# Patient Record
Sex: MALE | Race: WHITE | Employment: OTHER | URBAN - METROPOLITAN AREA
[De-identification: names, ages, dates, MRNs, and addresses within clinical notes are randomized per-mention and may not be internally consistent; named-entity substitution may affect disease eponyms.]

---

## 2017-02-15 ENCOUNTER — HOSPITAL ENCOUNTER (EMERGENCY)
Facility: CLINIC | Age: 77
Discharge: HOME | End: 2017-02-15
Attending: EMERGENCY MEDICINE | Admitting: EMERGENCY MEDICINE
Payer: MEDICARE

## 2017-02-15 ENCOUNTER — APPOINTMENT (OUTPATIENT)
Dept: GENERAL RADIOLOGY | Facility: CLINIC | Age: 77
End: 2017-02-15
Attending: EMERGENCY MEDICINE
Payer: MEDICARE

## 2017-02-15 VITALS
RESPIRATION RATE: 18 BRPM | HEIGHT: 69 IN | BODY MASS INDEX: 26.66 KG/M2 | SYSTOLIC BLOOD PRESSURE: 176 MMHG | OXYGEN SATURATION: 97 % | HEART RATE: 55 BPM | TEMPERATURE: 97.8 F | DIASTOLIC BLOOD PRESSURE: 74 MMHG | WEIGHT: 180 LBS

## 2017-02-15 DIAGNOSIS — R07.9 CHEST PAIN, UNSPECIFIED TYPE: ICD-10-CM

## 2017-02-15 LAB
ANION GAP SERPL CALCULATED.3IONS-SCNC: 9 MMOL/L (ref 3–14)
BUN SERPL-MCNC: 18 MG/DL (ref 7–30)
CALCIUM SERPL-MCNC: 8.7 MG/DL (ref 8.5–10.1)
CHLORIDE SERPL-SCNC: 106 MMOL/L (ref 94–109)
CO2 SERPL-SCNC: 27 MMOL/L (ref 20–32)
CREAT SERPL-MCNC: 1.1 MG/DL (ref 0.66–1.25)
ERYTHROCYTE [DISTWIDTH] IN BLOOD BY AUTOMATED COUNT: 13.1 % (ref 10–15)
GFR SERPL CREATININE-BSD FRML MDRD: 65 ML/MIN/1.7M2
GLUCOSE SERPL-MCNC: 155 MG/DL (ref 70–99)
HCT VFR BLD AUTO: 39.9 % (ref 40–53)
HGB BLD-MCNC: 14 G/DL (ref 13.3–17.7)
MCH RBC QN AUTO: 31 PG (ref 26.5–33)
MCHC RBC AUTO-ENTMCNC: 35.1 G/DL (ref 31.5–36.5)
MCV RBC AUTO: 88 FL (ref 78–100)
PLATELET # BLD AUTO: 152 10E9/L (ref 150–450)
POTASSIUM SERPL-SCNC: 3.6 MMOL/L (ref 3.4–5.3)
RBC # BLD AUTO: 4.52 10E12/L (ref 4.4–5.9)
SODIUM SERPL-SCNC: 142 MMOL/L (ref 133–144)
TROPONIN I SERPL-MCNC: NORMAL UG/L (ref 0–0.04)
TROPONIN I SERPL-MCNC: NORMAL UG/L (ref 0–0.04)
WBC # BLD AUTO: 7.1 10E9/L (ref 4–11)

## 2017-02-15 PROCEDURE — 99285 EMERGENCY DEPT VISIT HI MDM: CPT | Mod: 25

## 2017-02-15 PROCEDURE — A9270 NON-COVERED ITEM OR SERVICE: HCPCS | Mod: GY | Performed by: EMERGENCY MEDICINE

## 2017-02-15 PROCEDURE — 85027 COMPLETE CBC AUTOMATED: CPT | Performed by: EMERGENCY MEDICINE

## 2017-02-15 PROCEDURE — 84484 ASSAY OF TROPONIN QUANT: CPT | Mod: 91 | Performed by: EMERGENCY MEDICINE

## 2017-02-15 PROCEDURE — 93005 ELECTROCARDIOGRAM TRACING: CPT

## 2017-02-15 PROCEDURE — 25000132 ZZH RX MED GY IP 250 OP 250 PS 637: Mod: GY | Performed by: EMERGENCY MEDICINE

## 2017-02-15 PROCEDURE — 80048 BASIC METABOLIC PNL TOTAL CA: CPT | Performed by: EMERGENCY MEDICINE

## 2017-02-15 PROCEDURE — 71020 XR CHEST 2 VW: CPT

## 2017-02-15 RX ORDER — NITROGLYCERIN 0.4 MG/1
0.4 TABLET SUBLINGUAL EVERY 5 MIN PRN
Status: DISCONTINUED | OUTPATIENT
Start: 2017-02-15 | End: 2017-02-15 | Stop reason: HOSPADM

## 2017-02-15 RX ORDER — ASPIRIN 81 MG/1
324 TABLET, CHEWABLE ORAL ONCE
Status: COMPLETED | OUTPATIENT
Start: 2017-02-15 | End: 2017-02-15

## 2017-02-15 RX ADMIN — ASPIRIN 81 MG 324 MG: 81 TABLET ORAL at 14:25

## 2017-02-15 ASSESSMENT — ENCOUNTER SYMPTOMS
SHORTNESS OF BREATH: 0
VOMITING: 0
CHEST TIGHTNESS: 1
NAUSEA: 1

## 2017-02-15 NOTE — ED PROVIDER NOTES
History     Chief Complaint:  Chest Pain      The history is provided by the patient.      Robby Mccoy is a 76 year old male s/p CABG x 2 who presents with chest pain.  Over the last 2 days when bending over the patient experienced bilateral chest pain.  This pain was present only when bent over and then resolve immediately when upright. Today, at approximately 1000, he noticed he felt unwell with tightness in his chest constantly prompting visit to the emergency department.  Currently he rates his discomfort at 4/10 in severity located on the left side of his chest that has come and gone since onset.  He has not identified any aggravating or alleviating factors.  This discomfort is not similar to the pain he experienced prior to past bypasses.  He denies any shortness of breath, vomiting, hemoptysis, or other complaint.      PE/DVT RISK FACTORS:  Sex:    M  Tobacco:   Former smoker, QD 1988  Cancer:   Neg  Travel:   Neg  Surgery:   Neg  Other immobilization: Neg  Personal history:  Pos  Family history:  Neg     Allergies:  Clindamycin Base      Medications:    Omeprazole  Zantac  Finasteride  Levocetirizine dihydrochloride   Crestor  Aspirin 81 mg  Metoprolol  Lisinopril      Past Medical History:    Low back pain  Spinal stenosis  CAD  DVT    Past Surgical History:    CABG x 2    Family History:    History reviewed. No pertinent family history.      Social History:  Presents with spouse  Tobacco use: Former smoker, QD 1988   Alcohol use: Positive  PCP: Efren Herndon    Marital Status:          Review of Systems   Respiratory: Positive for chest tightness. Negative for shortness of breath.    Cardiovascular: Positive for chest pain.   Gastrointestinal: Positive for nausea. Negative for vomiting.   All other systems reviewed and are negative.      Physical Exam     Patient Vitals for the past 24 hrs:   BP Temp Temp src Pulse Heart Rate Resp SpO2 Height Weight   02/15/17 1645 165/69 - - - 46 - 98  "% - -   02/15/17 1630 163/72 - - - 46 - 98 % - -   02/15/17 1615 159/70 - - - 48 - 97 % - -   02/15/17 1600 158/68 - - - 46 - 97 % - -   02/15/17 1545 163/70 - - - 47 - 97 % - -   02/15/17 1530 158/67 - - - 48 - 99 % - -   02/15/17 1515 156/69 - - - 48 - 99 % - -   02/15/17 1445 165/70 - - - 50 - 99 % - -   02/15/17 1430 160/69 - - - 51 - 97 % - -   02/15/17 1415 - - - - 53 - 98 % - -   02/15/17 1408 (!) 203/85 - - - - - - - -   02/15/17 1407 - 97.8  F (36.6  C) Oral 55 - 18 97 % 1.753 m (5' 9\") 81.6 kg (180 lb)      Physical Exam    General:   Pleasant, age appropriate.     Patient resting comfortably in the bed with no objective signs of pain.  HEENT:    Oropharynx is moist, without lesions or trismus.  Eyes:    Conjunctiva normal, PERRL  Neck:    Supple, no meningismus.     CV:     Regular rate and rhythm.      No murmurs, rubs or gallops.       No JVD or unilateral leg swelling.       2+ radial pulses bilateral.       No lower extremity edema.  PULM:    Clear to auscultation bilateral.       No respiratory distress.      Good air exchange.     No rales or wheezing.  ABD:    Soft, non-tender, non-distended.       No pulsatile masses.       No rebound, guarding or rigidity.  MSK:     No gross deformity to all four extremities.   LYMPH:   No cervical lymphadenopathy.  NEURO:   Alert and oriented x 3.      Strength is equal and symmetric.  Skin:    Warm, dry and intact.    Psych:    Mood is good and affect is appropriate.        Emergency Department Course   ECG (14:07:41):  Rate 54 bpm. KS interval 172. QRS duration 88. QT/QTc 422/400. P-R-T axes 47 -27 32.  Sinus bradycardia.  Septal infarct, age undetermined.  Abnormal ECG.  Interpreted at 1410 by Peter Villa MD.     Imaging:  Radiographic findings were communicated with the patient and family who voiced understanding of the findings.    XR Chest:  IMPRESSION:  Sternal wires. 1.5 cm linear rodlike opacity/foreign body projects over the posterior right " lateral heart, anatomic location and significance not known. Correlate clinically. Otherwise negative chest. Lungs are clear. No acute process.    MARINA HU MD    Preliminary result per radiology.    Laboratory:  CBC: WNL (WBC 7.1, HGB 14.0, )   BMP: Glucose 155 (H) ow WNL (Creatinine 1.10)   1413: Troponin: <0.015   1623: Troponin: <0.015     Interventions:  1425: Aspirin chewable 324 mg PO    Emergency Department Course:  Past medical records, nursing notes, and vitals reviewed.  1409: I performed an exam of the patient as documented above.    IV inserted and blood drawn. The patient was placed on continuous cardiac monitoring and pulse oximetry.   Nitrostat not given as patient reported to nursing staff chest pain had completely resolved.    The patient was sent for a XR while in the emergency department, findings above.   I personally reviewed the laboratory results with the Patient and spouse and answered all related questions prior to discharge.    1705: I rechecked the patient.  Findings and plan explained to the Patient and spouse. Patient discharged home with instructions regarding supportive care, medications, and reasons to return. The importance of close follow-up was reviewed.      Impression & Plan    Medical Decision Making:  Robby Mccoy is a 76 year old male with a history of coronary artery disease who presents to the emergency department with atypical chest pain.  His initial chest pain only occurred when bending over and then today has been present since 10 am.  It is not classic for ACS and it is atypical to the symptoms he had leading up to his bypass surgery.  His EKG shows no ischemic changes.  He became symptom free upon presentation and did not receive any nitroglycerin.  Full dose aspirin provided.  Initial blood work was unrevealing.  His 2 hour delta troponin, done a little over 6 hours after onset of symptoms, is negative thus ruling out myocardial infarction.  No  clinical concern for PE, aortic dissection, or aneurysm.  Since he has been ruled out he is safe for discharge home.  I will arrange emergent outpatient stress test in next 72 hours and close follow up with primary physician or cardiologist.      Diagnosis:    ICD-10-CM    1. Chest pain, unspecified type R07.9 Exercise Stress Echocardiogram       Disposition:  Discharged to home with plan as outlined.      Joao Bridges  2/15/2017   [facility] EMERGENCY DEPARTMENT    I, Joao Bridges, am serving as a scribe at 2:09 PM on 2/15/2017 to document services personally performed by Peter Villa MD based on my observations and the provider's statements to me.       Peter Villa MD  02/15/17 0187

## 2017-02-15 NOTE — ED NOTES
Pt back from imaging and placed on monitors, continues to report no pain. Respirations equal and unlabored. Pt reports it is normal for his HR to be in the 40's.

## 2017-02-15 NOTE — ED NOTES
Pt reporting no chest tightness at this time. Dr. Villa informed, no nitro administered at this time, per MD request

## 2017-02-15 NOTE — ED AVS SNAPSHOT
[facility] Emergency Department    [address]    [facility] [address]    Phone:  [phone]    Fax:  [phone]                                       Robby Mccoy   MRN: [id number]    Department:  [facility] Emergency Department   Date of Visit:  2/15/2017           Patient Information     Date Of Birth          1940        Your diagnoses for this visit were:     Chest pain, unspecified type        You were seen by Peter Villa MD.      Follow-up Information     Follow up with Efren Lynn MD. Schedule an appointment as soon as possible for a visit in 2 days.    Specialty:  Family Medicine - Sports Medicine    Contact information:    [facility]  [address]  [facility] [id number] [phone]          Discharge Instructions       Discharge Instructions  Chest Pain    You have been seen today for chest pain or discomfort.  At this time, your doctor has found no signs that your chest pain is due to a serious or life-threatening condition, (or you have declined more testing and/or admission to the hospital). However, sometimes there is a serious problem that does not show up right away. Your evaluation today may not be complete and you may need further testing and evaluation.     You need to follow-up with your regular doctor within 3 days.    Return to the Emergency Department if:    Your chest pain changes, gets worse, starts to happen more often, or comes with less activity.    You are short of breath.    You get very weak or tired.    You pass out or faint.    You have any new symptoms, like fever, cough, numb legs, or you cough up blood.    You have anything else that worries you.    Until you follow-up with your regular doctor please do the following:    Take one aspirin daily unless you have an allergy or are told not to by your doctor.    If a stress test appointment has been made, go to the appointment.    If you  have questions, contact your regular doctor.    If your doctor today has told you to follow-up with your regular doctor, it is very important that you make an appointment with your clinic and go to the appointment.  If you do not follow-up with your primary doctor, it may result in missing an important development which could result in permanent injury or disability and/or lasting pain.  If there is any problem keeping your appointment, call your doctor or return to the Emergency Department.    If you were given a prescription for medicine here today, be sure to read all of the information (including the package insert) that comes with your prescription.  This will include important information about the medicine, its side effects, and any warnings that you need to know about.  The pharmacist who fills the prescription can provide more information and answer questions you may have about the medicine.  If you have questions or concerns that the pharmacist cannot address, please call or return to the Emergency Department.         24 Hour Appointment Hotline       To make an appointment at any [facility], call [phone] ([phone]). If you don't have a family doctor or clinic, we will help you find one. [facility] are conveniently located to serve the needs of you and your family.          ED Discharge Orders     Exercise Stress Echocardiogram       The supervising Cardiologist may change the type of echocardiogram requested to answer a specific clinical question based on existing protocols in the Echocardiography laboratory.    Use of contrast is at the discretion of the supervising Cardiologist.                     Review of your medicines      Our records show that you are taking the medicines listed below. If these are incorrect, please call your family doctor or clinic.        Dose / Directions Last dose taken    CRESTOR 20 MG tablet   Dose:  20 mg   Generic drug:  rosuvastatin        Take 20  mg by mouth daily.   Refills:  0        FINASTERIDE PO   Dose:  5 mg        Take 5 mg by mouth daily   Refills:  0        FLAXSEED OIL PO        Take by mouth daily   Refills:  0        LEVOCETIRIZINE DIHYDROCHLORIDE PO   Dose:  5 mg        Take 5 mg by mouth every evening   Refills:  0        lisinopril 5 MG tablet   Commonly known as:  PRINIVIL/ZESTRIL   Dose:  5 mg        Take 5 mg by mouth daily.   Refills:  0        metoprolol 25 MG 24 hr tablet   Commonly known as:  TOPROL-XL   Dose:  25 mg        Take 25 mg by mouth daily.   Refills:  0        OMEPRAZOLE PO   Dose:  20 mg        Take 20 mg by mouth every morning   Refills:  0        PLAVIX 75 MG tablet   Dose:  75 mg   Generic drug:  clopidogrel        Take 75 mg by mouth daily.   Refills:  0        ZANTAC PO   Dose:  150 mg        Take 150 mg by mouth At Bedtime   Refills:  0                Procedures and tests performed during your visit     Procedure/Test Number of Times Performed    Basic metabolic panel (BMP) 1    CBC (platelets, no diff) 1    Chest XR,  PA & LAT 1    EKG 12 lead 1    Peripheral IV catheter 1    Troponin I (now) 2      Orders Needing Specimen Collection     None      Pending Results     Date and Time Order Name Status Description    2/15/2017 1404 EKG 12 lead Preliminary             Pending Culture Results     No orders found from 2/13/2017 to 2/16/2017.             Test Results from your hospital stay     2/15/2017  2:40 PM - Interface, Flexilab Results      Component Results     Component Value Ref Range & Units Status    WBC 7.1 4.0 - 11.0 10e9/L Final    RBC Count 4.52 4.4 - 5.9 10e12/L Final    Hemoglobin 14.0 13.3 - 17.7 g/dL Final    Hematocrit 39.9 (L) 40.0 - 53.0 % Final    MCV 88 78 - 100 fl Final    MCH 31.0 26.5 - 33.0 pg Final    MCHC 35.1 31.5 - 36.5 g/dL Final    RDW 13.1 10.0 - 15.0 % Final    Platelet Count 152 150 - 450 10e9/L Final         2/15/2017  3:00 PM - Interface, Flexilab Results      Component Results      Component Value Ref Range & Units Status    Sodium 142 133 - 144 mmol/L Final    Potassium 3.6 3.4 - 5.3 mmol/L Final    Chloride 106 94 - 109 mmol/L Final    Carbon Dioxide 27 20 - 32 mmol/L Final    Anion Gap 9 3 - 14 mmol/L Final    Glucose 155 (H) 70 - 99 mg/dL Final    Urea Nitrogen 18 7 - 30 mg/dL Final    Creatinine 1.10 0.66 - 1.25 mg/dL Final    GFR Estimate 65 >60 mL/min/1.7m2 Final    Non  GFR Calc    GFR Estimate If Black 78 >60 mL/min/1.7m2 Final    African American GFR Calc    Calcium 8.7 8.5 - 10.1 mg/dL Final         2/15/2017  3:00 PM - Interface, Flexilab Results      Component Results     Component Value Ref Range & Units Status    Troponin I ES  0.000 - 0.045 ug/L Final    <0.015  The 99th percentile for upper reference range is 0.045 ug/L.  Troponin values in   the range of 0.045 - 0.120 ug/L may be associated with risks of adverse   clinical events.           2/15/2017  3:24 PM - Interface, Radiant Ib      Narrative     XR CHEST 2 VW  2/15/2017 2:59 PM    HISTORY:  chest pain    COMPARISON:  None.        Impression     IMPRESSION:  Sternal wires. 1.5 cm linear rodlike opacity/foreign body  projects over the posterior right lateral heart, anatomic location and  significance not known. Correlate clinically. Otherwise negative  chest. Lungs are clear. No acute process.    MARINA HU MD         2/15/2017  4:57 PM - Interface, Flexilab Results      Component Results     Component Value Ref Range & Units Status    Troponin I ES  0.000 - 0.045 ug/L Final    <0.015  The 99th percentile for upper reference range is 0.045 ug/L.  Troponin values in   the range of 0.045 - 0.120 ug/L may be associated with risks of adverse   clinical events.                  Clinical Quality Measure: Blood Pressure Screening     Your blood pressure was checked while you were in the emergency department today. The last reading we obtained was  BP: 165/69 . Please read the guidelines below about what these  numbers mean and what you should do about them.  If your systolic blood pressure (the top number) is less than 120 and your diastolic blood pressure (the bottom number) is less than 80, then your blood pressure is normal. There is nothing more that you need to do about it.  If your systolic blood pressure (the top number) is 120-139 or your diastolic blood pressure (the bottom number) is 80-89, your blood pressure may be higher than it should be. You should have your blood pressure rechecked within a year by a primary care provider.  If your systolic blood pressure (the top number) is 140 or greater or your diastolic blood pressure (the bottom number) is 90 or greater, you may have high blood pressure. High blood pressure is treatable, but if left untreated over time it can put you at risk for heart attack, stroke, or kidney failure. You should have your blood pressure rechecked by a primary care provider within the next 4 weeks.  If your provider in the emergency department today gave you specific instructions to follow-up with your doctor or provider even sooner than that, you should follow that instruction and not wait for up to 4 weeks for your follow-up visit.        Thank you for choosing [facility]       Thank you for choosing [facility] for your care. Our goal is always to provide you with excellent care. Hearing back from our patients is one way we can continue to improve our services. Please take a few minutes to complete the written survey that you may receive in the mail after you visit with us. Thank you!        [employer] Information     [employer] gives you secure access to your electronic health record. If you see a primary care provider, you can also send messages to your care team and make appointments. If you have questions, please call your primary care clinic.  If you do not have a primary care provider, please call [phone] and they will assist you.        Care EveryWhere ID     This is your Care  EveryWhere ID. This could be used by other organizations to access your [place] medical records  [id number]        After Visit Summary       This is your record. Keep this with you and show to your community pharmacist(s) and doctor(s) at your next visit.

## 2017-02-15 NOTE — ED NOTES
"Pt presents to ED reporting for the past two days when he bends over noticing left sided chest tightness reports today the pain being there constant. Hx of a bipass, took one baby aspirin today, states \"I just don't feel right\" ABCs intact. A/OX3  "

## 2017-02-15 NOTE — ED AVS SNAPSHOT
[facility] Emergency Department    [address]    [facility] [address]    Phone:  [phone]    Fax:  [phone]                                       Robby Mccoy   MRN: [id number]    Department:  [facility] Emergency Department   Date of Visit:  2/15/2017           After Visit Summary Signature Page     I have received my discharge instructions, and my questions have been answered. I have discussed any challenges I see with this plan with the nurse or doctor.    ..........................................................................................................................................  Patient/Patient Representative Signature      ..........................................................................................................................................  Patient Representative Print Name and Relationship to Patient    ..................................................               ................................................  Date                                            Time    ..........................................................................................................................................  Reviewed by Signature/Title    ...................................................              ..............................................  Date                                                            Time

## 2017-02-16 ENCOUNTER — HOSPITAL ENCOUNTER (OUTPATIENT)
Dept: CARDIOLOGY | Facility: CLINIC | Age: 77
Discharge: HOME | End: 2017-02-16
Attending: EMERGENCY MEDICINE | Admitting: EMERGENCY MEDICINE
Payer: MEDICARE

## 2017-02-16 DIAGNOSIS — R07.9 CHEST PAIN, UNSPECIFIED TYPE: ICD-10-CM

## 2017-02-16 LAB — INTERPRETATION ECG - MUSE: NORMAL

## 2017-02-16 PROCEDURE — 93325 DOPPLER ECHO COLOR FLOW MAPG: CPT | Mod: 26 | Performed by: INTERNAL MEDICINE

## 2017-02-16 PROCEDURE — 93016 CV STRESS TEST SUPVJ ONLY: CPT | Performed by: INTERNAL MEDICINE

## 2017-02-16 PROCEDURE — 93350 STRESS TTE ONLY: CPT | Mod: 26 | Performed by: INTERNAL MEDICINE

## 2017-02-16 PROCEDURE — 93350 STRESS TTE ONLY: CPT | Mod: TC

## 2017-02-16 PROCEDURE — 93321 DOPPLER ECHO F-UP/LMTD STD: CPT | Mod: 26 | Performed by: INTERNAL MEDICINE

## 2017-02-16 PROCEDURE — 93018 CV STRESS TEST I&R ONLY: CPT | Performed by: INTERNAL MEDICINE

## 2018-01-25 ENCOUNTER — TRANSFERRED RECORDS (OUTPATIENT)
Dept: HEALTH INFORMATION MANAGEMENT | Facility: CLINIC | Age: 78
End: 2018-01-25

## 2018-02-05 ENCOUNTER — TRANSFERRED RECORDS (OUTPATIENT)
Dept: HEALTH INFORMATION MANAGEMENT | Facility: CLINIC | Age: 78
End: 2018-02-05

## 2018-03-20 NOTE — TELEPHONE ENCOUNTER
APPT INFO    Date /Time: 3/29/18 12pm   Reason for Appt: Lower Back Pain   Ref Provider/Clinic: self   Are there internal records? Yes/No?  IF YES, list clinic names: Yes - [facility] (Dr. Davila)    Operative Note 10/30/08 in Epic    PT notes in Epic, Imaging in pacs   Are there outside records? Yes/No? Yes - see below   Patient Contact (Y/N) & Call Details: No - records in [facility]   Action: CareEverywhere records reviewed. See CareEverywhere Tab.    Faxed request to [facility] for records     OUTSIDE RECORDS CHECKLIST     CLINIC NAME COMMENTS REC (x) IMG (x)   [facility]    (Care Everywhere) OFFICE NOTES: 12/14/17, 8/29/17, 7/27/17    RADIOLOGY:   MR T Spine 12/15/17  XR L Spine 12/14/17  XR T Spine 12/14/17  XR C Spine 6/3/16        x x   [facility]  x x

## 2018-03-21 NOTE — TELEPHONE ENCOUNTER
Records Received From: [facility]    Date/Exam/Location  (specify location if different)   Office Notes: 2/5/18, 1/8/18, 8/22/16, 7/14/16, 11/8/07, 10/8/07, 6/22/06   Radiology Reports: XR L Spine 1/25/18 - [facility]  MR L Spine 1/25/18 - [facility]    ** Imaging pushed to pacs - Notified [facility] to pull.     PT/OT Notes: 2/5/18, 7/14/16

## 2018-03-21 NOTE — TELEPHONE ENCOUNTER
Received Imaging From: [facility]    Image Type (x): Disc:___  Pacs:_x__      Exam Date/Name: See previous note Comments: Notified Mad[facility] to pull

## 2018-03-29 ENCOUNTER — OFFICE VISIT (OUTPATIENT)
Dept: ORTHOPEDICS | Facility: CLINIC | Age: 78
End: 2018-03-29
Payer: COMMERCIAL

## 2018-03-29 ENCOUNTER — PRE VISIT (OUTPATIENT)
Dept: ORTHOPEDICS | Facility: CLINIC | Age: 78
End: 2018-03-29

## 2018-03-29 VITALS — BODY MASS INDEX: 27.13 KG/M2 | HEIGHT: 69 IN | WEIGHT: 183.2 LBS

## 2018-03-29 DIAGNOSIS — M54.50 CHRONIC RIGHT-SIDED LOW BACK PAIN WITHOUT SCIATICA: Primary | ICD-10-CM

## 2018-03-29 DIAGNOSIS — G89.29 CHRONIC RIGHT-SIDED LOW BACK PAIN WITHOUT SCIATICA: Primary | ICD-10-CM

## 2018-03-29 RX ORDER — ASPIRIN 81 MG/1
81 TABLET ORAL DAILY
Status: ON HOLD | COMMUNITY
End: 2018-07-20

## 2018-03-29 RX ORDER — FLUTICASONE PROPIONATE 50 MCG
1 SPRAY, SUSPENSION (ML) NASAL DAILY PRN
COMMUNITY
Start: 2017-06-14 | End: 2021-09-15

## 2018-03-29 ASSESSMENT — ENCOUNTER SYMPTOMS
NECK PAIN: 1
BACK PAIN: 1
MUSCLE CRAMPS: 0
JOINT SWELLING: 0
MUSCLE WEAKNESS: 0
STIFFNESS: 1
MYALGIAS: 1
ARTHRALGIAS: 1

## 2018-03-29 NOTE — LETTER
3/29/2018       RE: Robby Mccoy  [id number]   [facility] [address]     Dear Colleague,    Thank you for referring your patient, Robby Mccoy, to the [facility] at [facility]. Please see a copy of my visit note below.    REASON FOR CONSULTATION: Consult (Right Mid back pain; Sharp and stabbing pain since July 2017.)     REFERRING PHYSICIAN: Referred Self   PCP:Efren Lynn A    History of Present Illness: 77-year-old male with a history of L4-L5 TLIF who presents with right back pain for approximately 9 months.  The patient first noticed his back pain while riding in a golf cart in July 2017.  His pain is intermittent, immediate onset and offset, emanates from the lateral aspect of his right lower back and radially radiates medially as electric sensations.  Patient denies numbness, weakness, radiation of pain to legs or inciting trauma.  Pain is worse with straining, sneezing, forward flexion, turning his trunk to the right.  Unsure if there are any alleviating factors.  He takes Tylenol twice daily, which offer some relief.  Patient has been doing home physical therapy since his surgery in 2008, however he also had a formal physical therapy visit for new exercises a proximally 1.5 months ago.  The pain occasionally wakes him up at night with certain body positions.  Appearance is pain 5 days per week, however he is not currently experiencing these episodes of pain at today's visit.  He denies urinary incontinence.  He states he was evaluated approximately 4 years ago for hematuria and was diagnosed with a possible renal stone.      Right Back 100%    Previous surgery:   - 10/27/2008 - right L4-5 TLIF with Dr. 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  Happy with results.  Has some residual left thigh numbness but preoperative pain is  "significantly improved      Oswestry (CHARLES) Questionnaire    OSWESTRY DISABILITY INDEX 3/29/2018   Count 9   Sum 16   Oswestry Score (%) 35.56   Some recent data might be hidden            Neck Disability Index (NDI) Questionnaire    Neck Disability Index (NDI) 3/29/2018   Neck Disability Index: Count 4   NDI: Total Score = SUM (points for all 10 findings) 2   Neck Disability in Percent = (Total Score) / 50 * 100 10 (%)   Some recent data might be hidden            Visual Analog Pain Scale  Back Pain Scale 0-10: 1  Right leg pain: 0  Left leg pain: 0    PROMIS-10 Scores  Global Mental Health Score: (P) 13  Global Physical Health Score: (P) 11  PROMIS TOTAL - SUBSCORES: (P) 24    ROS:  A 12-point review of systems was completed and is negative except for otherwise noted above in the history of present illness.    Med Hx:  No past medical history on file.    Surg Hx:  No past surgical history on file.    Allergies:  Allergies   Allergen Reactions     Clindamycin Base        Meds:  Current Outpatient Prescriptions   Medication     aspirin EC 81 MG EC tablet     fluticasone (FLONASE) 50 MCG/ACT spray     OMEPRAZOLE PO     Ranitidine HCl (ZANTAC PO)     LEVOCETIRIZINE DIHYDROCHLORIDE PO     Flaxseed, Linseed, (FLAXSEED OIL PO)     rosuvastatin (CRESTOR) 20 MG tablet     metoprolol (TOPROL-XL) 25 MG 24 hr tablet     lisinopril (PRINIVIL,ZESTRIL) 5 MG tablet     FINASTERIDE PO     clopidogrel (PLAVIX) 75 MG tablet     No current facility-administered medications for this visit.        Fam Hx:  No family history on file.    P/S Hx:  Social History   Substance Use Topics     Smoking status: Former Smoker     Quit date: 1/1/1988     Smokeless tobacco: Never Used     Alcohol use Yes         Physical Exam:  Very pleasant, healthy appearing, alert, oriented x 3, cooperative.  Normal mood and affect.  Not in cardiorespiratory distress.  Ht 1.753 m (5' 9\")  Wt 83.1 kg (183 lb 3.2 oz)  BMI 27.05 kg/m2  Normal upright posture.  "   Normal gait without assistive device.  No antalgia / imbalance.  Able to walk on toes and on heels with ease.  Back: no deformity, no skin lesions or surgical scars.  Localizes pain in soft tissues just superiorly to PSIS  Tenderness: (-) midline, (-) paraspinal, (-) R and L PSIS.  ROM:   Stiff with back flexion and extension but no pain.    Neuro Exam:  Motor: 5/5 strength for all muscle groups in both LE's.  Sensory:  Intact to light touch in both LE's.   Reflexes:  Knee 2+ bilat.  Ankle 2+ bilat.  (-) Babinski, (-) clonus.    Lower Extremity:  Equal leg lengths, full pulses, (-) atrophy / asymmetry.  Full painless passive knee and ankle motion.  Straight leg raise: (-) right, (-) left.  Hip impingement: (-) right, (-) left.  MOY/Shen's: (-) right, (-) left.      Sacroiliac Joint Tests:      TEST RIGHT LEFT   PSIS tenderness - -   Rachel finger sign - -   MOY/Shen - -   Thigh thrust - -           Gapping -   Compression -   Sacral thrust -   Gaenslen's -           Imaging:  -X-rays of lumbar spine obtained 1/25/2018: No dynamic instability with flexion and extension.  Posterior fusion hardware intact.  No screw pullout.  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    Plan:   -We will attempt to characterize the location of the patient's pain with differential injections, starting with a right L3-4 transforaminal CHLOE.  The patient will call after receiving the injection to report whether or not he received relief.  -Follow-up as needed at this time    All questions and concerns were answered to the patient's apparent satisfaction before leaving the clinic.     The patient was seen and discussed with Dr. Hadley    Respectfully,  Boy Pruitt MD  PGY-1, Orthopaedic Surgery      Attestation:  I have  personally evaluated and carried out discussion with the patient, and agree with the findings and plan outlined in the note.  77/m, s/p TLIF L4-5 (Giovanni 2008), successful at the time, now presents with R-sided back pain x 9 mos.  X-rays suggest already solid fusion L4-5; retained posterior fixation.  No listhesis/instability.  MRI shows adj segment spinal stenosis L3-4 (note: sxs not suggestive of neurogenic claudication).  P> R L3-4 TFESI.  If with good temporary relief, may ultimate consider MIS decompression/ fusion L3-4.  If (-), may send to Pain Clinic / musculoskeletal clinic for further nonop treatment.  TT > 30 mins, > 50% CC.        Again, thank you for allowing me to participate in the care of your patient.      Sincerely,    Ganga Hadley MD

## 2018-03-29 NOTE — NURSING NOTE
"Reason For Visit:   Chief Complaint   Patient presents with     Consult     Right Mid back pain; Sharp and stabbing pain since July 2017.       Primary MD: Efren Lynn  Ref. MD: Self     ?  No  Occupation Retired.  Currently working? No.  Work status?  Retired.  Date of injury: Unsure  Type of injury: n/a.  Date of surgery: 1992  Type of surgery: Shaun cleanup  Date of surgery: 2008  Type of surgery: L4 and L5 Fusion  Date of surgery: 1980?  Type of surgery: Cervical Fusion.  Smoker: No  Request smoking cessation information: No    Ht 1.753 m (5' 9\")  Wt 83.1 kg (183 lb 3.2 oz)  BMI 27.05 kg/m2    Pain Assessment  Patient Currently in Pain: Yes  0-10 Pain Scale: 2 (on and off with movements - 10)  Primary Pain Location: Back  Pain Orientation: Mid  Pain Descriptors: Discomfort, Sharp  Aggravating Factors: Movement, Bending (twisting and riding in the car)    Oswestry (CHARLES) Questionnaire    OSWESTRY DISABILITY INDEX 3/29/2018   Count 9   Sum 16   Oswestry Score (%) 35.56   Some recent data might be hidden            Neck Disability Index (NDI) Questionnaire    Neck Disability Index (NDI) 3/29/2018   Neck Disability Index: Count 4   NDI: Total Score = SUM (points for all 10 findings) 2   Neck Disability in Percent = (Total Score) / 50 * 100 10 (%)   Some recent data might be hidden              Visual Analog Pain Scale  Back Pain Scale 0-10: 1  Right leg pain: 0  Left leg pain: 0    Promis 10 Assessment    PROMIS 10 3/29/2018   In general, would you say your health is: Good   In general, would you say your quality of life is: Good   In general, how would you rate your physical health? Good   In general, how would you rate your mental health, including your mood and your ability to think? Good   In general, how would you rate your satisfaction with your social activities and relationships? Good   In general, please rate how well you carry out your usual social activities and roles Good   To what extent " are you able to carry out your everyday physical activities such as walking, climbing stairs, carrying groceries, or moving a chair? Moderately   How often have you been bothered by emotional problems such as feeling anxious, depressed or irritable? Rarely   How would you rate your fatigue on average? Mild   How would you rate your pain on average?   0 = No Pain  to  10 = Worst Imaginable Pain 10   In general, would you say your health is: 3   In general, would you say your quality of life is: 3   In general, how would you rate your physical health? 3   In general, how would you rate your mental health, including your mood and your ability to think? 3   In general, how would you rate your satisfaction with your social activities and relationships? 3   In general, please rate how well you carry out your usual social activities and roles. (This includes activities at home, at work and in your community, and responsibilities as a parent, child, spouse, employee, friend, etc.) 3   To what extent are you able to carry out your everyday physical activities such as walking, climbing stairs, carrying groceries, or moving a chair? 3   In the past 7 days, how often have you been bothered by emotional problems such as feeling anxious, depressed, or irritable? 2   In the past 7 days, how would you rate your fatigue on average? 2   In the past 7 days, how would you rate your pain on average, where 0 means no pain, and 10 means worst imaginable pain? 10   Global Mental Health Score 13   Global Physical Health Score 11   PROMIS TOTAL - SUBSCORES 24   Some recent data might be hidden                Tal BENZ CMA

## 2018-03-29 NOTE — MR AVS SNAPSHOT
"              After Visit Summary   3/29/2018    Robby Mccoy    MRN: [id number]           Patient Information     Date Of Birth          1940        Visit Information        Provider Department      3/29/2018 12:00 PM Ganga Hadley MD [facility]        Today's Diagnoses     Chronic right-sided low back pain without sciatica    -  1       Follow-ups after your visit        Follow-up notes from your care team     Return if symptoms worsen or fail to improve.      Who to contact     Please call your clinic at [phone] to:    Ask questions about your health    Make or cancel appointments    Discuss your medicines    Learn about your test results    Speak to your doctor            Additional Information About Your Visit        [employer] Information     [employer] gives you secure access to your electronic health record. If you see a primary care provider, you can also send messages to your care team and make appointments. If you have questions, please call your primary care clinic.  If you do not have a primary care provider, please call [phone] and they will assist you.      [employer] is an electronic gateway that provides easy, online access to your medical records. With [employer], you can request a clinic appointment, read your test results, renew a prescription or communicate with your care team.     To access your existing account, please contact your [facility] or call [phone] for assistance.        Care EveryWhere ID     This is your Care EveryWhere ID. This could be used by other organizations to access your [place] medical records  [id number]        Your Vitals Were     Height BMI (Body Mass Index)                1.753 m (5' 9\") 27.05 kg/m2           Blood Pressure from Last 3 Encounters:   02/15/17 176/74   08/10/12 130/60    Weight from Last 3 Encounters:   03/29/18 83.1 kg (183 lb 3.2 oz)   02/15/17 81.6 kg (180 lb)   08/28/15 " 82.4 kg (181 lb 9.6 oz)               Primary Care Provider Office Phone # Fax #    Efren Lynn [phone]       [facility] [address] [address] [address]        Equal Access to Services     EKTAMELISSA ESTELA : Hadii aad ku hadasho Soomaali, waaxda luqadaha, qaybta kaalmada adeegyada, waxay karlin hayapryln adeefrain gerhard jamie ingram. So [facility] [phone].    ATENCIÓN: Si habla español, tiene a rivas disposición servicios gratuitos de asistencia lingüística. Llame al [phone].    We comply with applicable federal civil rights laws and [place] laws. We do not discriminate on the basis of race, color, national origin, age, disability, sex, sexual orientation, or gender identity.            Thank you!     Thank you for choosing [facility]  for your care. Our goal is always to provide you with excellent care. Hearing back from our patients is one way we can continue to improve our services. Please take a few minutes to complete the written survey that you may receive in the mail after your visit with us. Thank you!             Your Updated Medication List - Protect others around you: Learn how to safely use, store and throw away your medicines at www.disposemymeds.org.          This list is accurate as of 3/29/18 11:59 PM.  Always use your most recent med list.                   Brand Name Dispense Instructions for use Diagnosis    aspirin EC 81 MG EC tablet      Take 81 mg by mouth        CRESTOR 20 MG tablet   Generic drug:  rosuvastatin      Take 20 mg by mouth daily.        FINASTERIDE PO      Take 5 mg by mouth daily        FLAXSEED OIL PO      Take by mouth daily        fluticasone 50 MCG/ACT spray    FLONASE     1 spray        LEVOCETIRIZINE DIHYDROCHLORIDE PO      Take 5 mg by mouth every evening        lisinopril 5 MG tablet    PRINIVIL/ZESTRIL     Take 5 mg by mouth daily.        metoprolol succinate 25 MG 24 hr tablet    TOPROL-XL     Take 25 mg by mouth  daily.        OMEPRAZOLE PO      Take 20 mg by mouth every morning        PLAVIX 75 MG tablet   Generic drug:  clopidogrel      Take 75 mg by mouth daily.        ZANTAC PO      Take 150 mg by mouth At Bedtime

## 2018-03-29 NOTE — PROGRESS NOTES
REASON FOR CONSULTATION: Consult (Right Mid back pain; Sharp and stabbing pain since July 2017.)     REFERRING PHYSICIAN: Referred Self   PCP:Efren Lynn A    History of Present Illness: 77-year-old male with a history of L4-L5 TLIF who presents with right back pain for approximately 9 months.  The patient first noticed his back pain while riding in a golf cart in July 2017.  His pain is intermittent, immediate onset and offset, emanates from the lateral aspect of his right lower back and radially radiates medially as electric sensations.  Patient denies numbness, weakness, radiation of pain to legs or inciting trauma.  Pain is worse with straining, sneezing, forward flexion, turning his trunk to the right.  Unsure if there are any alleviating factors.  He takes Tylenol twice daily, which offer some relief.  Patient has been doing home physical therapy since his surgery in 2008, however he also had a formal physical therapy visit for new exercises a proximally 1.5 months ago.  The pain occasionally wakes him up at night with certain body positions.  Appearance is pain 5 days per week, however he is not currently experiencing these episodes of pain at today's visit.  He denies urinary incontinence.  He states he was evaluated approximately 4 years ago for hematuria and was diagnosed with a possible renal stone.      Right Back 100%    Previous surgery:   - 10/27/2008 - right L4-5 TLIF with Dr. 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  Happy with results.  Has some residual left thigh numbness but preoperative pain is significantly improved      Oswestry (CHARLES) Questionnaire    OSWESTRY DISABILITY INDEX 3/29/2018   Count 9   Sum 16   Oswestry Score (%) 35.56   Some recent data might be hidden            Neck Disability Index (NDI) Questionnaire    Neck Disability Index (NDI) 3/29/2018   Neck Disability Index: Count 4  "  NDI: Total Score = SUM (points for all 10 findings) 2   Neck Disability in Percent = (Total Score) / 50 * 100 10 (%)   Some recent data might be hidden            Visual Analog Pain Scale  Back Pain Scale 0-10: 1  Right leg pain: 0  Left leg pain: 0    PROMIS-10 Scores  Global Mental Health Score: (P) 13  Global Physical Health Score: (P) 11  PROMIS TOTAL - SUBSCORES: (P) 24    ROS:  A 12-point review of systems was completed and is negative except for otherwise noted above in the history of present illness.    Med Hx:  No past medical history on file.    Surg Hx:  No past surgical history on file.    Allergies:  Allergies   Allergen Reactions     Clindamycin Base        Meds:  Current Outpatient Prescriptions   Medication     aspirin EC 81 MG EC tablet     fluticasone (FLONASE) 50 MCG/ACT spray     OMEPRAZOLE PO     Ranitidine HCl (ZANTAC PO)     LEVOCETIRIZINE DIHYDROCHLORIDE PO     Flaxseed, Linseed, (FLAXSEED OIL PO)     rosuvastatin (CRESTOR) 20 MG tablet     metoprolol (TOPROL-XL) 25 MG 24 hr tablet     lisinopril (PRINIVIL,ZESTRIL) 5 MG tablet     FINASTERIDE PO     clopidogrel (PLAVIX) 75 MG tablet     No current facility-administered medications for this visit.        Fam Hx:  No family history on file.    P/S Hx:  Social History   Substance Use Topics     Smoking status: Former Smoker     Quit date: 1/1/1988     Smokeless tobacco: Never Used     Alcohol use Yes         Physical Exam:  Very pleasant, healthy appearing, alert, oriented x 3, cooperative.  Normal mood and affect.  Not in cardiorespiratory distress.  Ht 1.753 m (5' 9\")  Wt 83.1 kg (183 lb 3.2 oz)  BMI 27.05 kg/m2  Normal upright posture.    Normal gait without assistive device.  No antalgia / imbalance.  Able to walk on toes and on heels with ease.  Back: no deformity, no skin lesions or surgical scars.  Localizes pain in soft tissues just superiorly to PSIS  Tenderness: (-) midline, (-) paraspinal, (-) R and L PSIS.  ROM:   Stiff with back " flexion and extension but no pain.    Neuro Exam:  Motor: 5/5 strength for all muscle groups in both LE's.  Sensory:  Intact to light touch in both LE's.   Reflexes:  Knee 2+ bilat.  Ankle 2+ bilat.  (-) Babinski, (-) clonus.    Lower Extremity:  Equal leg lengths, full pulses, (-) atrophy / asymmetry.  Full painless passive knee and ankle motion.  Straight leg raise: (-) right, (-) left.  Hip impingement: (-) right, (-) left.  MOY/Shen's: (-) right, (-) left.      Sacroiliac Joint Tests:      TEST RIGHT LEFT   PSIS tenderness - -   Rachel finger sign - -   MOY/Shen - -   Thigh thrust - -           Gapping -   Compression -   Sacral thrust -   Gaenslen's -           Imaging:  -X-rays of lumbar spine obtained 1/25/2018: No dynamic instability with flexion and extension.  Posterior fusion hardware intact.  No screw pullout.  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    Plan:   -We will attempt to characterize the location of the patient's pain with differential injections, starting with a right L3-4 transforaminal CHLOE.  The patient will call after receiving the injection to report whether or not he received relief.  -Follow-up as needed at this time    All questions and concerns were answered to the patient's apparent satisfaction before leaving the clinic.     The patient was seen and discussed with Dr. Hadley    Respectfully,  Boy Pruitt MD  PGY-1, Orthopaedic Surgery    Answers for HPI/ROS submitted by the patient on 3/29/2018   General Symptoms: No  Skin Symptoms: No  HENT Symptoms: No  EYE SYMPTOMS: No  HEART SYMPTOMS: No  LUNG SYMPTOMS: No  INTESTINAL SYMPTOMS: No  URINARY SYMPTOMS: No  REPRODUCTIVE SYMPTOMS: No  SKELETAL SYMPTOMS: Yes  BLOOD SYMPTOMS: No  NERVOUS SYSTEM SYMPTOMS: No  MENTAL HEALTH  SYMPTOMS: No  Back pain: Yes  Muscle aches: Yes  Neck pain: Yes  Swollen joints: No  Joint pain: Yes  Bone pain: No  Muscle cramps: No  Muscle weakness: No  Joint stiffness: Yes  Bone fracture: No    Attestation:  I have personally evaluated and carried out discussion with the patient, and agree with the findings and plan outlined in the note.  77/m, s/p TLIF L4-5 (Giovanni 2008), successful at the time, now presents with R-sided back pain x 9 mos.  X-rays suggest already solid fusion L4-5; retained posterior fixation.  No listhesis/instability.  MRI shows adj segment spinal stenosis L3-4 (note: sxs not suggestive of neurogenic claudication).  P> R L3-4 TFESI.  If with good temporary relief, may ultimate consider MIS decompression/ fusion L3-4.  If (-), may send to Pain Clinic / musculoskeletal clinic for further nonop treatment.  TT > 30 mins, > 50% CC.      Addendum 5/23/18:  R L3-4 TFESI done 4/18/18.  Per report, pain went down to 0/10 shortly after.  Per patient, 100% pain relief (see phone/mychart note).  My consider MIS laminectomy vs fusion L3-4.

## 2018-04-09 ENCOUNTER — TELEPHONE (OUTPATIENT)
Dept: GENERAL RADIOLOGY | Facility: CLINIC | Age: 78
End: 2018-04-09

## 2018-04-18 ENCOUNTER — RADIANT APPOINTMENT (OUTPATIENT)
Dept: GENERAL RADIOLOGY | Facility: CLINIC | Age: 78
End: 2018-04-18
Payer: COMMERCIAL

## 2018-04-18 VITALS
RESPIRATION RATE: 16 BRPM | OXYGEN SATURATION: 99 % | DIASTOLIC BLOOD PRESSURE: 70 MMHG | HEART RATE: 46 BPM | SYSTOLIC BLOOD PRESSURE: 188 MMHG

## 2018-04-18 DIAGNOSIS — M54.50 CHRONIC RIGHT-SIDED LOW BACK PAIN WITHOUT SCIATICA: ICD-10-CM

## 2018-04-18 DIAGNOSIS — G89.29 CHRONIC RIGHT-SIDED LOW BACK PAIN WITHOUT SCIATICA: ICD-10-CM

## 2018-04-18 RX ORDER — METHYLPREDNISOLONE ACETATE 80 MG/ML
80 INJECTION, SUSPENSION INTRA-ARTICULAR; INTRALESIONAL; INTRAMUSCULAR; SOFT TISSUE ONCE
Status: COMPLETED | OUTPATIENT
Start: 2018-04-18 | End: 2018-04-18

## 2018-04-18 RX ORDER — BUPIVACAINE HYDROCHLORIDE 5 MG/ML
10 INJECTION, SOLUTION EPIDURAL; INTRACAUDAL ONCE
Status: COMPLETED | OUTPATIENT
Start: 2018-04-18 | End: 2018-04-18

## 2018-04-18 RX ORDER — LIDOCAINE HYDROCHLORIDE 10 MG/ML
30 INJECTION, SOLUTION EPIDURAL; INFILTRATION; INTRACAUDAL; PERINEURAL ONCE
Status: COMPLETED | OUTPATIENT
Start: 2018-04-18 | End: 2018-04-18

## 2018-04-18 RX ORDER — IOPAMIDOL 408 MG/ML
20 INJECTION, SOLUTION INTRATHECAL ONCE
Status: COMPLETED | OUTPATIENT
Start: 2018-04-18 | End: 2018-04-18

## 2018-04-18 RX ADMIN — IOPAMIDOL 2 ML: 408 INJECTION, SOLUTION INTRATHECAL at 10:49

## 2018-04-18 RX ADMIN — BUPIVACAINE HYDROCHLORIDE 1 MG: 5 INJECTION, SOLUTION EPIDURAL; INTRACAUDAL at 10:49

## 2018-04-18 RX ADMIN — LIDOCAINE HYDROCHLORIDE 5 ML: 10 INJECTION, SOLUTION EPIDURAL; INFILTRATION; INTRACAUDAL; PERINEURAL at 10:49

## 2018-04-18 RX ADMIN — METHYLPREDNISOLONE ACETATE 80 MG: 80 INJECTION, SUSPENSION INTRA-ARTICULAR; INTRALESIONAL; INTRAMUSCULAR; SOFT TISSUE at 10:49

## 2018-04-18 NOTE — PROGRESS NOTES
Pt did well with the procedure. No complications. Bp elevated without symptoms.pt states he has tried to call his primary to increase his medications and hasn't heard back said he will call today. notified Dr. Cuellar. No new orders. AVS given and pt escorted to the waiting room.    Piper Briscoe RN, BSN

## 2018-04-18 NOTE — MR AVS SNAPSHOT
MRN:[id number]                      After Visit Summary   4/18/2018    Robby Mccoy    MRN: [id number]           Visit Information        Provider Department      4/18/2018 10:30 AM  NEURO RAD;  IMAGING NURSE; XR3 [facility] Xray           Review of your medicines          These changes are accurate as of 4/18/18 10:31 AM.  If you have any questions, ask your nurse or doctor.               CONTINUE these medicines which have NOT CHANGED        Dose / Directions    aspirin EC 81 MG EC tablet        Dose:  81 mg   Take 81 mg by mouth   Refills:  0       CRESTOR 20 MG tablet   Generic drug:  rosuvastatin        Dose:  20 mg   Take 20 mg by mouth daily.   Refills:  0       FINASTERIDE PO        Dose:  5 mg   Take 5 mg by mouth daily   Refills:  0       FLAXSEED OIL PO        Take by mouth daily   Refills:  0       fluticasone 50 MCG/ACT spray   Commonly known as:  FLONASE        Dose:  1 spray   1 spray   Refills:  0       LEVOCETIRIZINE DIHYDROCHLORIDE PO        Dose:  5 mg   Take 5 mg by mouth every evening   Refills:  0       lisinopril 5 MG tablet   Commonly known as:  PRINIVIL/ZESTRIL        Dose:  5 mg   Take 5 mg by mouth daily.   Refills:  0       metoprolol succinate 25 MG 24 hr tablet   Commonly known as:  TOPROL-XL        Dose:  25 mg   Take 25 mg by mouth daily.   Refills:  0       OMEPRAZOLE PO        Dose:  20 mg   Take 20 mg by mouth every morning   Refills:  0       ZANTAC PO        Dose:  150 mg   Take 150 mg by mouth At Bedtime   Refills:  0                Protect others around you: Learn how to safely use, store and throw away your medicines at www.disposemymeds.org.         Follow-ups after your visit         Care Instructions        Further instructions from your care team       Discharge Instructions for Epidural Steroid Injection/Nerve Block    Care of injection site:    If you have new numbness down your leg after the injection, this may last up to  4-6 hours, but should go away. You may need help with walking until your leg feels normal.    Over the next 24 to 48 hours, pain at the injection site may increase before it gets better.    For the next 48 hours, use ice packs for 15 minutes,3-4 times a day for pain relief.    If you have a bandage, you may remove it the next morning     Do not submerge injection site in water for 24 hours, (no baths. pools, hot tubs). Showers are OK.            Activity:    You should relax today and then you may return to your regular activity tomorrow.    You may eat a normal diet.    Medicines:    Restart all your medicines tomorrow at your regular dose, including Coumadin (Warfarin).    If you are restarting Coumadin, talk to your doctor about having your INR checked.    If you received steroids: The steroid should help reduce swelling and pain. This may take from 3-14 days. Pain from the shot will be mild and go away in 2-3 days.     Common side effects may include:    Insomnia    Heartburn    Flushed face    Water retention    Increased appetite    Increased blood sugar      If you have diabetes, watch your blood sugar closely, If needed, call your doctor to help control your blood sugar.    Some patients will get lasting relief from a single injection. Others may require additional injections to get results.     Call your doctor or go to the Emergency Room if you have new severe pain, fever, or problems with your bowel or bladder control.     Additional Information About Your Visit        nCinoharGetBack Information     [employer] gives you secure access to your electronic health record. If you see a primary care provider, you can also send messages to your care team and make appointments. If you have questions, please call your primary care clinic.  If you do not have a primary care provider, please call [phone] and they will assist you.      [employer] is an electronic gateway that provides easy, online access to your medical  records. With [employer], you can request a clinic appointment, read your test results, renew a prescription or communicate with your care team.     To access your existing account, please contact your [facility] or call [phone] for assistance.        Care EveryWhere ID     This is your Care EveryWhere ID. This could be used by other organizations to access your [facility] medical records  [id number]        Your Vitals Were     Blood Pressure Pulse Respirations Pulse Oximetry          174/70 44 16 99%         Primary Care Provider Office Phone # Fax #    Efren Lynn [phone]      Equal Access to Services     [facility]: Hadii aad haley hadasho Soomaali, waaxda luqadaha, qaybta kaalmada adeegyada, loretta ayala . So [facility] [phone].    ATENCIÓN: Si habla español, tiene a rivas disposición servicios gratuitos de asistencia lingüística. Ll[facility] [phone].    We comply with applicable federal civil rights laws and [place] laws. We do not discriminate on the basis of race, color, national origin, age, disability, sex, sexual orientation, or gender identity.            Thank you!     Thank you for choosing [facility] for your care. Our goal is always to provide you with excellent care. Hearing back from our patients is one way we can continue to improve our services. Please take a few minutes to complete the written survey that you may receive in the mail after you visit with us. Thank you!             Medication List: This is a list of all your medications and when to take them. Check marks below indicate your daily home schedule. Keep this list as a reference.          This list is accurate as of 4/18/18 10:31 AM.  Always use your most recent med list.               Medications           Morning Afternoon Evening Bedtime As Needed    aspirin EC 81 MG EC tablet   Take 81 mg by mouth                                CRESTOR 20 MG tablet    Take 20 mg by mouth daily.   Generic drug:  rosuvastatin                                FINASTERIDE PO   Take 5 mg by mouth daily                                FLAXSEED OIL PO   Take by mouth daily                                fluticasone 50 MCG/ACT spray   Commonly known as:  FLONASE   1 spray                                LEVOCETIRIZINE DIHYDROCHLORIDE PO   Take 5 mg by mouth every evening                                lisinopril 5 MG tablet   Commonly known as:  PRINIVIL/ZESTRIL   Take 5 mg by mouth daily.                                metoprolol succinate 25 MG 24 hr tablet   Commonly known as:  TOPROL-XL   Take 25 mg by mouth daily.                                OMEPRAZOLE PO   Take 20 mg by mouth every morning                                ZANTAC PO   Take 150 mg by mouth At Bedtime

## 2018-05-15 ENCOUNTER — TELEPHONE (OUTPATIENT)
Dept: ORTHOPEDICS | Facility: CLINIC | Age: 78
End: 2018-05-15

## 2018-05-15 NOTE — TELEPHONE ENCOUNTER
"Called patient who reported that the lumbar CHLOE done on 4/18/18 \"took a day but got 100% relief\". Lasted for a week, back pain is still improved from before injection. Please call him with plan.   "

## 2018-05-15 NOTE — TELEPHONE ENCOUNTER
[facility] Call Center    Phone Message    May a detailed message be left on voicemail: yes    Reason for Call: Other: Pt of Dr Hadley, pt would like another back injection, please call to schedule     Action Taken: Message routed to:  [facility] Clinic

## 2018-05-21 NOTE — TELEPHONE ENCOUNTER
[facility] Call Center    Phone Message    May a detailed message be left on voicemail: no    Reason for Call: Other: Pt of Dr. Hadley, pt called last week and no one has followed up with him.  Pt would like a call back today     Action Taken: Message routed to:  [facility] Ortho   .   See message about  100% pain relief after injection.  I called pt. To tell him we will review results of injection with  & get back to him with plan.  Pt. Understands  is gone right now & agreed.  Stefan Hardin RN.    wants return to clinic appt. To discuss surgery.  Pt. Agreed.  Appt. Made.  W.O./Stefan Hardin RN.    wants return appt. To discuss surgery.  Pt. Agreed.  appt made.  W.O./Fredy Hardin RN.

## 2018-05-31 ENCOUNTER — OFFICE VISIT (OUTPATIENT)
Dept: ORTHOPEDICS | Facility: CLINIC | Age: 78
End: 2018-05-31
Payer: COMMERCIAL

## 2018-05-31 VITALS — BODY MASS INDEX: 26.22 KG/M2 | WEIGHT: 177 LBS | HEIGHT: 69 IN

## 2018-05-31 DIAGNOSIS — M48.061 SPINAL STENOSIS, LUMBAR REGION, WITHOUT NEUROGENIC CLAUDICATION: Primary | ICD-10-CM

## 2018-05-31 ASSESSMENT — ENCOUNTER SYMPTOMS
EYE IRRITATION: 0
EYE WATERING: 0
EYE REDNESS: 0
DOUBLE VISION: 0
EYE PAIN: 0

## 2018-05-31 NOTE — LETTER
5/31/2018     RE: Robby Mccoy  [id number]  [place] [address]     Dear Colleague,    Thank you for referring your patient, Robby Mccoy, to the [facility] at [facility]. Please see a copy of my visit note below.    Spine Surgical Hx:    10/27/2008 - TLIF via R-sided facetectomy and cage placement L4-5; use of local autograft and cancellous allograft (Giovanni) for DDD with far lateral HNP.  Implants: Medtronic capstone PEEK cage 12mm ht; screw system not mentioned, likely Medtronic Legacy.      REASON FOR VISIT: RECHECK (FU Injection, Lumbar Stenosis, discuss Surgery)     REFERRING PHYSICIAN: Referred Self   PCP: Efren Lynn    History of Present Illness: 77-year-old male with a history of L4-L5 TLIF who returns for reevaluation of his low back.  He had a L3-L4 epidural steroid injection on the right in April 2018.  He reports the injection really started working about 2 days after was given.  He reports a couple week history of relief from the injection.  He then notes a return of his low back symptoms.  About a week ago the patient notes he started sitting in an upright chair versus a recliner.  He notes having significant increase in his comfort level after doing this.  He was even able to play 18 holes of golf on Tuesday.  He is wondering when he can have another injection of his back.    Previous surgery:   - 10/27/2008 - right L4-5 TLIF with Dr. 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  Happy with results.  Has some residual left thigh numbness but preoperative pain is significantly improved    Oswestry (CHARLES) Questionnaire    OSWESTRY DISABILITY INDEX 5/31/2018   Count 9   Sum 5   Oswestry Score (%) 11.11   Some recent data might be hidden      Neck Disability Index (NDI) Questionnaire    Neck Disability Index (NDI) 3/29/2018  "  Neck Disability Index: Count 4   NDI: Total Score = SUM (points for all 10 findings) 2   Neck Disability in Percent = (Total Score) / 50 * 100 10 (%)   Some recent data might be hidden      Visual Analog Pain Scale  Back Pain Scale 0-10: 1  Right leg pain: 0  Left leg pain: 0    PROMIS-10 Scores  Global Mental Health Score: (P) 13  Global Physical Health Score: (P) 14  PROMIS TOTAL - SUBSCORES: (P) 27    ROS:  A 12-point review of systems was completed and is negative except for otherwise noted above in the history of present illness.    Med Hx:  No past medical history on file.    Surg Hx:  No past surgical history on file.    Allergies:  Allergies   Allergen Reactions     Clindamycin Base        Meds:  Current Outpatient Prescriptions   Medication     aspirin EC 81 MG EC tablet     FINASTERIDE PO     Flaxseed, Linseed, (FLAXSEED OIL PO)     fluticasone (FLONASE) 50 MCG/ACT spray     LEVOCETIRIZINE DIHYDROCHLORIDE PO     lisinopril (PRINIVIL,ZESTRIL) 5 MG tablet     metoprolol (TOPROL-XL) 25 MG 24 hr tablet     OMEPRAZOLE PO     Ranitidine HCl (ZANTAC PO)     rosuvastatin (CRESTOR) 20 MG tablet     No current facility-administered medications for this visit.        Fam Hx:  No family history on file.    P/S Hx:  Social History   Substance Use Topics     Smoking status: Former Smoker     Quit date: 1/1/1988     Smokeless tobacco: Never Used     Alcohol use Yes         Physical Exam:  Very pleasant, healthy appearing, alert, oriented x 3, cooperative.  Normal mood and affect.  Not in cardiorespiratory distress.  Ht 1.753 m (5' 9\")  Wt 80.3 kg (177 lb)  BMI 26.14 kg/m2  Normal upright posture.    Normal gait without assistive device.  No antalgia / imbalance.  Back: well healed midline surgical scar.    Neuro Exam:  Motor: 5/5 strength for all muscle groups in both LE's.  Sensory:  Intact to light touch in both LE's.   Reflexes:  Knee 1+ bilat.  Ankle 1+ bilat. (-) clonus.        Imaging:  None new. Priors " reviewed.    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    Plan:   Discussed with the patient and his wife today that given the fact that he had significant relief from a corticosteroid injection at right L3-L4 and that some postural changes and making at home have led to significant relief of his symptoms, there is not an indication currently for surgery.  We discussed that if in the future if he has worsening back pain or his pain becomes refractory to cortical steroid injections we consider extending his fusion to the L3-L4 level.  We did discuss details of the surgical procedure today.  However the patient would like to receive another corticosteroid injection in the future once his current one wears off.  May repeat 2-3 x/yr (max 4).  He will call our clinic to request an order when he is ready for a repeat injection.  Otherwise we will see him back on an as-needed basis.  We will only see him back in clinic for surgical consultation and ideally he will be scheduled for a morning visit.    All questions answered.    The patient was seen and discussed with Dr. Jomar Rhodes MD  PGY-1 Orthopaedic Surgery  Answers for HPI/ROS submitted by the patient on 5/31/2018   General Symptoms: No  Skin Symptoms: No  HENT Symptoms: No  EYE SYMPTOMS: Yes  HEART SYMPTOMS: No  LUNG SYMPTOMS: No  INTESTINAL SYMPTOMS: No  URINARY SYMPTOMS: No  REPRODUCTIVE SYMPTOMS: No  SKELETAL SYMPTOMS: No  BLOOD SYMPTOMS: No  NERVOUS SYSTEM SYMPTOMS: No  MENTAL HEALTH SYMPTOMS: No  Eye pain: No  Vision loss: No  Dry eyes: No  Watery eyes: No  Eye bulging: No  Double vision: No  Flashing of lights: No  Spots: No  Floaters: Yes  Redness: No  Crossed eyes: No  Tunnel Vision: No  Yellowing of eyes: No  Eye irritation: No      Attestation:  I have personally evaluated patient with PGY-1 Meagan and agree with the findings and plan outlined in the note.  I discussed at length with the patient/family, explained the  nature of spinal condition, and formulated further workup or treatment plan with the patient.  All questions were answered to the best of my ability and to patient's apparent satisfaction.    A> Adjacent segment stenosis L3-4    P> May continue with rpt CHLOE (R L3-4 TFESI) 2-3x/yr as long as it is helping.  In future, may consider extending fusion to L3-4 (e.g. LLIF and mini-open posterior fixation L3-4).  Of note, pain pattern is not consistent with neurogenic claudication, and with significant back pain; thus, fusion may likely be more effective than decompression alone.  Loves playing golf, and says he would do anything to be able to continue playing.  RTC prn.  TT > 15 mins, > 50% CC.    Again, thank you for allowing me to participate in the care of your patient.      Sincerely,    Ganga Hadley MD

## 2018-05-31 NOTE — NURSING NOTE
"Reason For Visit:   Chief Complaint   Patient presents with     RECHECK     FU Injection, Lumbar Stenosis, discuss Surgery       Primary MD: Efren Lynn A  ?  No  Occupation Retired.  Currently working? No.  Work status?  Retired.  Date of injury: Unsure  Type of injury: n/a.  Date of surgery: 1992  Type of surgery: Disk cleanup  Date of surgery: 2008  Type of surgery: L4 and L5 Fusion  Date of surgery: 1980?  Type of surgery: Cervical Fusion.  Smoker: No  Request smoking cessation information: No    Ht 1.753 m (5' 9\")  Wt 80.3 kg (177 lb)  BMI 26.14 kg/m2    Pain Assessment  Patient Currently in Pain: Denies    Oswestry (CHARLES) Questionnaire    OSWESTRY DISABILITY INDEX 5/31/2018   Count 9   Sum 5   Oswestry Score (%) 11.11   Some recent data might be hidden            Neck Disability Index (NDI) Questionnaire    Neck Disability Index (NDI) 3/29/2018   Neck Disability Index: Count 4   NDI: Total Score = SUM (points for all 10 findings) 2   Neck Disability in Percent = (Total Score) / 50 * 100 10 (%)   Some recent data might be hidden              Visual Analog Pain Scale  Neck Pain Scale 0-10: 0  Right arm pain: 0  Left arm pain: 0    Promis 10 Assessment    PROMIS 10 5/31/2018   In general, would you say your health is: Good   In general, would you say your quality of life is: Good   In general, how would you rate your physical health? Good   In general, how would you rate your mental health, including your mood and your ability to think? Good   In general, how would you rate your satisfaction with your social activities and relationships? Good   In general, please rate how well you carry out your usual social activities and roles Good   To what extent are you able to carry out your everyday physical activities such as walking, climbing stairs, carrying groceries, or moving a chair? Mostly   How often have you been bothered by emotional problems such as feeling anxious, depressed or irritable? Rarely "   How would you rate your fatigue on average? Mild   How would you rate your pain on average?   0 = No Pain  to  10 = Worst Imaginable Pain 4   In general, would you say your health is: 3   In general, would you say your quality of life is: 3   In general, how would you rate your physical health? 3   In general, how would you rate your mental health, including your mood and your ability to think? 3   In general, how would you rate your satisfaction with your social activities and relationships? 3   In general, please rate how well you carry out your usual social activities and roles. (This includes activities at home, at work and in your community, and responsibilities as a parent, child, spouse, employee, friend, etc.) 3   To what extent are you able to carry out your everyday physical activities such as walking, climbing stairs, carrying groceries, or moving a chair? 4   In the past 7 days, how often have you been bothered by emotional problems such as feeling anxious, depressed, or irritable? 2   In the past 7 days, how would you rate your fatigue on average? 2   In the past 7 days, how would you rate your pain on average, where 0 means no pain, and 10 means worst imaginable pain? 4   Global Mental Health Score 13   Global Physical Health Score 14   PROMIS TOTAL - SUBSCORES 27   Some recent data might be hidden                Tal BENZ, CMA

## 2018-05-31 NOTE — PROGRESS NOTES
Spine Surgical Hx:    10/27/2008 - TLIF via R-sided facetectomy and cage placement L4-5; use of local autograft and cancellous allograft (Giovanni) for DDD with far lateral HNP.  Implants: Medtronic capstone PEEK cage 12mm ht; screw system not mentioned, likely Medtronic Legacy.      REASON FOR VISIT: RECHECK (FU Injection, Lumbar Stenosis, discuss Surgery)     REFERRING PHYSICIAN: Referred Self   PCP: Efren Lynn A    History of Present Illness: 77-year-old male with a history of L4-L5 TLIF who returns for reevaluation of his low back.  He had a L3-L4 epidural steroid injection on the right in April 2018.  He reports the injection really started working about 2 days after was given.  He reports a couple week history of relief from the injection.  He then notes a return of his low back symptoms.  About a week ago the patient notes he started sitting in an upright chair versus a recliner.  He notes having significant increase in his comfort level after doing this.  He was even able to play 18 holes of golf on Tuesday.  He is wondering when he can have another injection of his back.    Previous surgery:   - 10/27/2008 - right L4-5 TLIF with Dr. 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  Happy with results.  Has some residual left thigh numbness but preoperative pain is significantly improved    Oswestry (CHARLES) Questionnaire    OSWESTRY DISABILITY INDEX 5/31/2018   Count 9   Sum 5   Oswestry Score (%) 11.11   Some recent data might be hidden      Neck Disability Index (NDI) Questionnaire    Neck Disability Index (NDI) 3/29/2018   Neck Disability Index: Count 4   NDI: Total Score = SUM (points for all 10 findings) 2   Neck Disability in Percent = (Total Score) / 50 * 100 10 (%)   Some recent data might be hidden      Visual Analog Pain Scale  Back Pain Scale 0-10: 1  Right leg pain: 0  Left leg pain: 0    PROMIS-10  "Scores  Global Mental Health Score: (P) 13  Global Physical Health Score: (P) 14  PROMIS TOTAL - SUBSCORES: (P) 27    ROS:  A 12-point review of systems was completed and is negative except for otherwise noted above in the history of present illness.    Med Hx:  No past medical history on file.    Surg Hx:  No past surgical history on file.    Allergies:  Allergies   Allergen Reactions     Clindamycin Base        Meds:  Current Outpatient Prescriptions   Medication     aspirin EC 81 MG EC tablet     FINASTERIDE PO     Flaxseed, Linseed, (FLAXSEED OIL PO)     fluticasone (FLONASE) 50 MCG/ACT spray     LEVOCETIRIZINE DIHYDROCHLORIDE PO     lisinopril (PRINIVIL,ZESTRIL) 5 MG tablet     metoprolol (TOPROL-XL) 25 MG 24 hr tablet     OMEPRAZOLE PO     Ranitidine HCl (ZANTAC PO)     rosuvastatin (CRESTOR) 20 MG tablet     No current facility-administered medications for this visit.        Fam Hx:  No family history on file.    P/S Hx:  Social History   Substance Use Topics     Smoking status: Former Smoker     Quit date: 1/1/1988     Smokeless tobacco: Never Used     Alcohol use Yes         Physical Exam:  Very pleasant, healthy appearing, alert, oriented x 3, cooperative.  Normal mood and affect.  Not in cardiorespiratory distress.  Ht 1.753 m (5' 9\")  Wt 80.3 kg (177 lb)  BMI 26.14 kg/m2  Normal upright posture.    Normal gait without assistive device.  No antalgia / imbalance.  Back: well healed midline surgical scar.    Neuro Exam:  Motor: 5/5 strength for all muscle groups in both LE's.  Sensory:  Intact to light touch in both LE's.   Reflexes:  Knee 1+ bilat.  Ankle 1+ bilat. (-) clonus.        Imaging:  None new. Priors reviewed.    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    Plan:   Discussed with the patient and his wife today that given the fact that he had significant relief from a corticosteroid injection at right L3-L4 and that some postural changes and making at home have led to " significant relief of his symptoms, there is not an indication currently for surgery.  We discussed that if in the future if he has worsening back pain or his pain becomes refractory to cortical steroid injections we consider extending his fusion to the L3-L4 level.  We did discuss details of the surgical procedure today.  However the patient would like to receive another corticosteroid injection in the future once his current one wears off.  May repeat 2-3 x/yr (max 4).  He will call our clinic to request an order when he is ready for a repeat injection.  Otherwise we will see him back on an as-needed basis.  We will only see him back in clinic for surgical consultation and ideally he will be scheduled for a morning visit.    All questions answered.    The patient was seen and discussed with Dr. Jomar Rhodes MD  PGY-1 Orthopaedic Surgery  Answers for HPI/ROS submitted by the patient on 5/31/2018   General Symptoms: No  Skin Symptoms: No  HENT Symptoms: No  EYE SYMPTOMS: Yes  HEART SYMPTOMS: No  LUNG SYMPTOMS: No  INTESTINAL SYMPTOMS: No  URINARY SYMPTOMS: No  REPRODUCTIVE SYMPTOMS: No  SKELETAL SYMPTOMS: No  BLOOD SYMPTOMS: No  NERVOUS SYSTEM SYMPTOMS: No  MENTAL HEALTH SYMPTOMS: No  Eye pain: No  Vision loss: No  Dry eyes: No  Watery eyes: No  Eye bulging: No  Double vision: No  Flashing of lights: No  Spots: No  Floaters: Yes  Redness: No  Crossed eyes: No  Tunnel Vision: No  Yellowing of eyes: No  Eye irritation: No      Attestation:  I have personally evaluated patient with PGY-1 Meagan and agree with the findings and plan outlined in the note.  I discussed at length with the patient/family, explained the nature of spinal condition, and formulated further workup or treatment plan with the patient.  All questions were answered to the best of my ability and to patient's apparent satisfaction.    A> Adjacent segment stenosis L3-4    P> May continue with rpt CHLOE (R L3-4 TFESI) 2-3x/yr as long as it is  helping.  In future, may consider extending fusion to L3-4 (e.g. LLIF and mini-open posterior fixation L3-4).  Of note, pain pattern is not consistent with neurogenic claudication, and with significant back pain; thus, fusion may likely be more effective than decompression alone.  Loves playing golf, and says he would do anything to be able to continue playing.  RTC prn.  TT > 15 mins, > 50% CC.

## 2018-05-31 NOTE — MR AVS SNAPSHOT
"              After Visit Summary   5/31/2018    Robby Mccoy    MRN: [id number]           Patient Information     Date Of Birth          1940        Visit Information        Provider Department      5/31/2018 2:15 PM Ganga Hadley MD [facility]        Today's Diagnoses     Spinal stenosis, lumbar region, without neurogenic claudication    -  1       Follow-ups after your visit        Follow-up notes from your care team     Return if symptoms worsen or fail to improve.      Who to contact     Please call your clinic at [phone] to:    Ask questions about your health    Make or cancel appointments    Discuss your medicines    Learn about your test results    Speak to your doctor            Additional Information About Your Visit        [employer] Information     [employer] gives you secure access to your electronic health record. If you see a primary care provider, you can also send messages to your care team and make appointments. If you have questions, please call your primary care clinic.  If you do not have a primary care provider, please call [phone] and they will assist you.      [employer] is an electronic gateway that provides easy, online access to your medical records. With [employer], you can request a clinic appointment, read your test results, renew a prescription or communicate with your care team.     To access your existing account, please contact your [facility] or call [phone] for assistance.        Care EveryWhere ID     This is your Care EveryWhere ID. This could be used by other organizations to access your [place] medical records  [id number]        Your Vitals Were     Height BMI (Body Mass Index)                1.753 m (5' 9\") 26.14 kg/m2           Blood Pressure from Last 3 Encounters:   04/18/18 188/70   02/15/17 176/74   08/10/12 130/60    Weight from Last 3 Encounters:   05/31/18 80.3 kg (177 lb)   03/29/18 83.1 " kg (183 lb 3.2 oz)   02/15/17 81.6 kg (180 lb)              Today, you had the following     No orders found for display       Primary Care Provider Office Phone # Fax #    Efren Lynn [phone]       [facility] [address]  [facility] [address]        Equal Access to Services     [facility]: Hadii aad ku hadasho Soomaali, waaxda luqadaha, qaybta kaalmada adeegyada, waxay idiin hayaan adeeg kharash lacorinn . So [facility] [phone].    ATENCIÓN: Si habla esptasia, tiene a rivas disposición servicios gratuitos de asistencia lingüística. Vasquezame al [phone].    We comply with applicable federal civil rights laws and [place] laws. We do not discriminate on the basis of race, color, national origin, age, disability, sex, sexual orientation, or gender identity.            Thank you!     Thank you for choosing [facility]  for your care. Our goal is always to provide you with excellent care. Hearing back from our patients is one way we can continue to improve our services. Please take a few minutes to complete the written survey that you may receive in the mail after your visit with us. Thank you!             Your Updated Medication List - Protect others around you: Learn how to safely use, store and throw away your medicines at www.disposemymeds.org.          This list is accurate as of 5/31/18 11:59 PM.  Always use your most recent med list.                   Brand Name Dispense Instructions for use Diagnosis    aspirin 81 MG EC tablet      Take 81 mg by mouth        CRESTOR 20 MG tablet   Generic drug:  rosuvastatin      Take 20 mg by mouth daily.        FINASTERIDE PO      Take 5 mg by mouth daily        FLAXSEED OIL PO      Take by mouth daily        fluticasone 50 MCG/ACT spray    FLONASE     1 spray        LEVOCETIRIZINE DIHYDROCHLORIDE PO      Take 5 mg by mouth every evening        lisinopril 5 MG tablet    PRINIVIL/ZESTRIL     Take 5 mg by mouth  daily.        metoprolol succinate 25 MG 24 hr tablet    TOPROL-XL     Take 25 mg by mouth daily.        OMEPRAZOLE PO      Take 20 mg by mouth every morning        ZANTAC PO      Take 150 mg by mouth At Bedtime

## 2018-07-20 ENCOUNTER — ANESTHESIA EVENT (OUTPATIENT)
Dept: SURGERY | Facility: CLINIC | Age: 78
End: 2018-07-20
Payer: MEDICARE

## 2018-07-20 ENCOUNTER — SURGERY (OUTPATIENT)
Age: 78
End: 2018-07-20

## 2018-07-20 ENCOUNTER — ANESTHESIA (OUTPATIENT)
Dept: SURGERY | Facility: CLINIC | Age: 78
End: 2018-07-20
Payer: MEDICARE

## 2018-07-20 ENCOUNTER — HOSPITAL ENCOUNTER (OUTPATIENT)
Facility: CLINIC | Age: 78
Discharge: HOME | End: 2018-07-20
Attending: UROLOGY | Admitting: UROLOGY
Payer: MEDICARE

## 2018-07-20 VITALS
HEART RATE: 48 BPM | DIASTOLIC BLOOD PRESSURE: 74 MMHG | RESPIRATION RATE: 15 BRPM | SYSTOLIC BLOOD PRESSURE: 172 MMHG | BODY MASS INDEX: 26.39 KG/M2 | OXYGEN SATURATION: 96 % | WEIGHT: 178.2 LBS | HEIGHT: 69 IN | TEMPERATURE: 97.1 F

## 2018-07-20 DIAGNOSIS — R39.12 BENIGN PROSTATIC HYPERPLASIA WITH WEAK URINARY STREAM: Primary | ICD-10-CM

## 2018-07-20 DIAGNOSIS — N40.1 BENIGN PROSTATIC HYPERPLASIA WITH WEAK URINARY STREAM: Primary | ICD-10-CM

## 2018-07-20 PROCEDURE — 71000027 ZZH RECOVERY PHASE 2 EACH 15 MINS: Performed by: UROLOGY

## 2018-07-20 PROCEDURE — 25000566 ZZH SEVOFLURANE, EA 15 MIN: Performed by: UROLOGY

## 2018-07-20 PROCEDURE — 40000170 ZZH STATISTIC PRE-PROCEDURE ASSESSMENT II: Performed by: UROLOGY

## 2018-07-20 PROCEDURE — 25000128 H RX IP 250 OP 636: Performed by: NURSE ANESTHETIST, CERTIFIED REGISTERED

## 2018-07-20 PROCEDURE — 37000009 ZZH ANESTHESIA TECHNICAL FEE, EACH ADDTL 15 MIN: Performed by: UROLOGY

## 2018-07-20 PROCEDURE — 36000075 ZZH SURGERY LEVEL 6 EA 15 ADDTL MIN: Performed by: UROLOGY

## 2018-07-20 PROCEDURE — 25800025 ZZH RX 258: Performed by: UROLOGY

## 2018-07-20 PROCEDURE — 36000073 ZZH SURGERY LEVEL 6 1ST 30 MIN: Performed by: UROLOGY

## 2018-07-20 PROCEDURE — 25000128 H RX IP 250 OP 636: Performed by: ANESTHESIOLOGY

## 2018-07-20 PROCEDURE — 71000012 ZZH RECOVERY PHASE 1 LEVEL 1 FIRST HR: Performed by: UROLOGY

## 2018-07-20 PROCEDURE — 25000128 H RX IP 250 OP 636: Performed by: UROLOGY

## 2018-07-20 PROCEDURE — 27210794 ZZH OR GENERAL SUPPLY STERILE: Performed by: UROLOGY

## 2018-07-20 PROCEDURE — 25000125 ZZHC RX 250: Performed by: NURSE ANESTHETIST, CERTIFIED REGISTERED

## 2018-07-20 PROCEDURE — 37000008 ZZH ANESTHESIA TECHNICAL FEE, 1ST 30 MIN: Performed by: UROLOGY

## 2018-07-20 PROCEDURE — 25000125 ZZHC RX 250: Performed by: UROLOGY

## 2018-07-20 PROCEDURE — 27210995 ZZH RX 272: Performed by: UROLOGY

## 2018-07-20 PROCEDURE — 71000013 ZZH RECOVERY PHASE 1 LEVEL 1 EA ADDTL HR: Performed by: UROLOGY

## 2018-07-20 RX ORDER — NALOXONE HYDROCHLORIDE 0.4 MG/ML
.1-.4 INJECTION, SOLUTION INTRAMUSCULAR; INTRAVENOUS; SUBCUTANEOUS
Status: DISCONTINUED | OUTPATIENT
Start: 2018-07-20 | End: 2018-07-20 | Stop reason: HOSPADM

## 2018-07-20 RX ORDER — ONDANSETRON 2 MG/ML
4 INJECTION INTRAMUSCULAR; INTRAVENOUS EVERY 30 MIN PRN
Status: DISCONTINUED | OUTPATIENT
Start: 2018-07-20 | End: 2018-07-20 | Stop reason: HOSPADM

## 2018-07-20 RX ORDER — EPHEDRINE SULFATE 50 MG/ML
INJECTION, SOLUTION INTRAMUSCULAR; INTRAVENOUS; SUBCUTANEOUS PRN
Status: DISCONTINUED | OUTPATIENT
Start: 2018-07-20 | End: 2018-07-20

## 2018-07-20 RX ORDER — DEXAMETHASONE SODIUM PHOSPHATE 4 MG/ML
INJECTION, SOLUTION INTRA-ARTICULAR; INTRALESIONAL; INTRAMUSCULAR; INTRAVENOUS; SOFT TISSUE PRN
Status: DISCONTINUED | OUTPATIENT
Start: 2018-07-20 | End: 2018-07-20

## 2018-07-20 RX ORDER — GENTAMICIN SULFATE 80 MG/100ML
80 INJECTION, SOLUTION INTRAVENOUS EVERY 8 HOURS
Status: DISCONTINUED | OUTPATIENT
Start: 2018-07-20 | End: 2018-07-20 | Stop reason: HOSPADM

## 2018-07-20 RX ORDER — MEPERIDINE HYDROCHLORIDE 25 MG/ML
12.5 INJECTION INTRAMUSCULAR; INTRAVENOUS; SUBCUTANEOUS
Status: DISCONTINUED | OUTPATIENT
Start: 2018-07-20 | End: 2018-07-20 | Stop reason: HOSPADM

## 2018-07-20 RX ORDER — FUROSEMIDE 10 MG/ML
INJECTION INTRAMUSCULAR; INTRAVENOUS PRN
Status: DISCONTINUED | OUTPATIENT
Start: 2018-07-20 | End: 2018-07-20

## 2018-07-20 RX ORDER — SODIUM CHLORIDE, SODIUM LACTATE, POTASSIUM CHLORIDE, CALCIUM CHLORIDE 600; 310; 30; 20 MG/100ML; MG/100ML; MG/100ML; MG/100ML
INJECTION, SOLUTION INTRAVENOUS CONTINUOUS
Status: DISCONTINUED | OUTPATIENT
Start: 2018-07-20 | End: 2018-07-20 | Stop reason: HOSPADM

## 2018-07-20 RX ORDER — MAGNESIUM HYDROXIDE 1200 MG/15ML
LIQUID ORAL PRN
Status: DISCONTINUED | OUTPATIENT
Start: 2018-07-20 | End: 2018-07-20 | Stop reason: HOSPADM

## 2018-07-20 RX ORDER — ONDANSETRON 2 MG/ML
INJECTION INTRAMUSCULAR; INTRAVENOUS PRN
Status: DISCONTINUED | OUTPATIENT
Start: 2018-07-20 | End: 2018-07-20

## 2018-07-20 RX ORDER — CEPHALEXIN 500 MG/1
500 CAPSULE ORAL 2 TIMES DAILY
Qty: 14 CAPSULE | Refills: 0 | Status: SHIPPED | OUTPATIENT
Start: 2018-07-20 | End: 2021-09-15

## 2018-07-20 RX ORDER — ONDANSETRON 4 MG/1
4 TABLET, ORALLY DISINTEGRATING ORAL EVERY 30 MIN PRN
Status: DISCONTINUED | OUTPATIENT
Start: 2018-07-20 | End: 2018-07-20 | Stop reason: HOSPADM

## 2018-07-20 RX ORDER — HYDROMORPHONE HYDROCHLORIDE 1 MG/ML
.3-.5 INJECTION, SOLUTION INTRAMUSCULAR; INTRAVENOUS; SUBCUTANEOUS EVERY 10 MIN PRN
Status: DISCONTINUED | OUTPATIENT
Start: 2018-07-20 | End: 2018-07-20 | Stop reason: HOSPADM

## 2018-07-20 RX ORDER — FENTANYL CITRATE 50 UG/ML
25-50 INJECTION, SOLUTION INTRAMUSCULAR; INTRAVENOUS
Status: DISCONTINUED | OUTPATIENT
Start: 2018-07-20 | End: 2018-07-20 | Stop reason: HOSPADM

## 2018-07-20 RX ORDER — HYDROCODONE BITARTRATE AND ACETAMINOPHEN 5; 325 MG/1; MG/1
1 TABLET ORAL ONCE
Status: DISCONTINUED | OUTPATIENT
Start: 2018-07-20 | End: 2018-07-20 | Stop reason: HOSPADM

## 2018-07-20 RX ORDER — LIDOCAINE HYDROCHLORIDE 20 MG/ML
INJECTION, SOLUTION INFILTRATION; PERINEURAL PRN
Status: DISCONTINUED | OUTPATIENT
Start: 2018-07-20 | End: 2018-07-20

## 2018-07-20 RX ORDER — HYDROCODONE BITARTRATE AND ACETAMINOPHEN 5; 325 MG/1; MG/1
1-2 TABLET ORAL EVERY 4 HOURS PRN
Qty: 15 TABLET | Refills: 0 | Status: SHIPPED | OUTPATIENT
Start: 2018-07-20 | End: 2021-09-15

## 2018-07-20 RX ORDER — FENTANYL CITRATE 50 UG/ML
INJECTION, SOLUTION INTRAMUSCULAR; INTRAVENOUS PRN
Status: DISCONTINUED | OUTPATIENT
Start: 2018-07-20 | End: 2018-07-20

## 2018-07-20 RX ORDER — PROPOFOL 10 MG/ML
INJECTION, EMULSION INTRAVENOUS PRN
Status: DISCONTINUED | OUTPATIENT
Start: 2018-07-20 | End: 2018-07-20

## 2018-07-20 RX ADMIN — GENTAMICIN SULFATE 80 MG: 80 INJECTION, SOLUTION INTRAVENOUS at 07:39

## 2018-07-20 RX ADMIN — ATROPA BELLADONNA AND OPIUM 30 MG: 16.2; 3 SUPPOSITORY RECTAL at 08:14

## 2018-07-20 RX ADMIN — Medication 5 MG: at 07:40

## 2018-07-20 RX ADMIN — SODIUM CHLORIDE, POTASSIUM CHLORIDE, SODIUM LACTATE AND CALCIUM CHLORIDE: 600; 310; 30; 20 INJECTION, SOLUTION INTRAVENOUS at 07:28

## 2018-07-20 RX ADMIN — SODIUM CHLORIDE 3000 ML: 900 IRRIGANT IRRIGATION at 07:52

## 2018-07-20 RX ADMIN — Medication 5 MG: at 07:45

## 2018-07-20 RX ADMIN — DEXMEDETOMIDINE HYDROCHLORIDE 8 MCG: 100 INJECTION, SOLUTION INTRAVENOUS at 07:42

## 2018-07-20 RX ADMIN — ONDANSETRON 4 MG: 2 INJECTION INTRAMUSCULAR; INTRAVENOUS at 08:14

## 2018-07-20 RX ADMIN — FENTANYL CITRATE 50 MCG: 50 INJECTION, SOLUTION INTRAMUSCULAR; INTRAVENOUS at 07:35

## 2018-07-20 RX ADMIN — FENTANYL CITRATE 50 MCG: 50 INJECTION, SOLUTION INTRAMUSCULAR; INTRAVENOUS at 07:39

## 2018-07-20 RX ADMIN — SODIUM CHLORIDE 3000 ML: 900 IRRIGANT IRRIGATION at 07:54

## 2018-07-20 RX ADMIN — PROPOFOL 200 MG: 10 INJECTION, EMULSION INTRAVENOUS at 07:35

## 2018-07-20 RX ADMIN — FUROSEMIDE 10 MG: 10 INJECTION, SOLUTION INTRAVENOUS at 08:15

## 2018-07-20 RX ADMIN — Medication 5 MG: at 07:35

## 2018-07-20 RX ADMIN — DEXAMETHASONE SODIUM PHOSPHATE 4 MG: 4 INJECTION, SOLUTION INTRA-ARTICULAR; INTRALESIONAL; INTRAMUSCULAR; INTRAVENOUS; SOFT TISSUE at 07:41

## 2018-07-20 RX ADMIN — SODIUM CHLORIDE 1000 ML: 900 IRRIGANT IRRIGATION at 07:52

## 2018-07-20 RX ADMIN — LIDOCAINE HYDROCHLORIDE 10 ML: 20 JELLY TOPICAL at 08:13

## 2018-07-20 RX ADMIN — LIDOCAINE HYDROCHLORIDE 40 MG: 20 INJECTION, SOLUTION INFILTRATION; PERINEURAL at 07:35

## 2018-07-20 ASSESSMENT — LIFESTYLE VARIABLES: TOBACCO_USE: 1

## 2018-07-20 NOTE — ANESTHESIA POSTPROCEDURE EVALUATION
Patient: Robby Mccoy    Procedure(s):  CYSTOSCOPY TRANSURETHRAL RESECTION OF PROSTATE  - Wound Class: II-Clean Contaminated    Diagnosis:HEMATURIA, BPH  Diagnosis Additional Information: No value filed.    Anesthesia Type:  General, LMA    Note:  Anesthesia Post Evaluation    Patient location during evaluation: PACU  Patient participation: Able to fully participate in evaluation  Level of consciousness: awake  Pain management: adequate  Airway patency: patent  Cardiovascular status: acceptable  Respiratory status: acceptable  Hydration status: acceptable  PONV: none     Anesthetic complications: None          Last vitals:  Vitals:    07/20/18 0900 07/20/18 0915 07/20/18 0930   BP: 149/65 152/72 172/74   Pulse:      Resp: 14 16 15   Temp: 35.8  C (96.5  F)  36.2  C (97.1  F)   SpO2: 100% 99% 96%         Electronically Signed By: Madonna Myers MD  July 20, 2018  10:58 AM

## 2018-07-20 NOTE — IP AVS SNAPSHOT
MRN:[id number]                      After Visit Summary   7/20/2018    Robby Mccoy    MRN: [id number]           Thank you!     Thank you for choosing [facility] for your care. Our goal is always to provide you with excellent care. Hearing back from our patients is one way we can continue to improve our services. Please take a few minutes to complete the written survey that you may receive in the mail after you visit with us. Thank you!        Patient Information     Date Of Birth          1940        Designated Caregiver       Most Recent Value    Caregiver    Will someone help with your care after discharge? yes    Name of designated caregiver Korin     Phone number of caregiver      Caregiver address [address]       About your hospital stay     You were admitted on:  July 20, 2018 You last received care in the:  [facility] PACU    You were discharged on:  July 20, 2018       Who to Call     For medical emergencies, please call 911.  For non-urgent questions about your medical care, please call your primary care provider or clinic, [phone]  For questions related to your surgery, please call your surgery clinic        Attending Provider     Provider Specialty    Joe Martinez MD Urology       Primary Care Provider Office Phone # Fax #    Efren Lynn [phone]      After Care Instructions     Discharge Instructions       Patient to arrange for follow up appointment in 3 days. SEE Monday JANETTE OFFICE 10 AM TO REMOVE SCOTT            Encourage fluids       Encourage fluids at home to keep urine clear to light pink            No Aspirin, Ibuprofen or Naproxen products       for 3 days following surgery            No lifting over 15 pounds and no strenuous physical activity       for 2 weeks                  Further instructions from your care team       Same Day Surgery Discharge Instructions for  Sedation and General  Anesthesia       It's not unusual to feel dizzy, light-headed or faint for up to 24 hours after surgery or while taking pain medication.  If you have these symptoms: sit for a few minutes before standing and have someone assist you when you get up to walk or use the bathroom.      You should rest and relax for the next 24 hours. We recommend you make arrangements to have an adult stay with you for at least 24 hours after your discharge.  Avoid hazardous and strenuous activity.      DO NOT DRIVE any vehicle or operate mechanical equipment for 24 hours following the end of your surgery.  Even though you may feel normal, your reactions may be affected by the medication you have received.      Do not drink alcoholic beverages for 24 hours following surgery.       Slowly progress to your regular diet as you feel able. It's not unusual to feel nauseated and/or vomit after receiving anesthesia.  If you develop these symptoms, drink clear liquids (apple juice, ginger ale, broth, 7-up, etc. ) until you feel better.  If your nausea and vomiting persists for 24 hours, please notify your surgeon.        All narcotic pain medications, along with inactivity and anesthesia, can cause constipation. Drinking plenty of liquids and increasing fiber intake will help.      For any questions of a medical nature, call your surgeon.      Do not make important decisions for 24 hours.      If you had general anesthesia, you may have a sore throat for a couple of days related to the breathing tube used during surgery.  You may use Cepacol lozenges to help with this discomfort.  If it worsens or if you develop a fever, contact your surgeon.       If you feel your pain is not well managed with the pain medications prescribed by your surgeon, please contact your surgeon's office to let them know so they can address your concerns.     Discharge Instructions for Transurethral Resection of a Bladder Tumor        Diet:     Diet as  tolerated.    Drink at least 6 glasses of liquid a day-at least 3 glasses should be water.  Activity:     Avoid heavy lifting or strenuous activity     Increase activity as directed by your surgeon     Short walks and stair climbing are OK     Showering is OK  Care after surgery:    Do not hold urine in your bladder. Always empty your bladder when you have the urge to urinate .     Do not strain to have a bowel movement. If you constipated, take an over the counter stool softener until stools become normal or soft.  This may take several weeks.     Do not drive a car or have intercourse until cleared by your doctor.  It is not unusual to pass small clots or to have red-tinged urine. If this occurs, decrease activity, and increase your fluid intake. Generally, the urine will clear of blood within a day or two.    Call your physician if you have the following signs or symptoms:    Painful urination or unable to urinate.    Loss of bladder control or increased frequency of urination.    Have chills or temperature greater than 101 F.  Excessive blood in your urine     DISCHARGE INSTRUCTIONS FOR   CATHETER CARE AT HOME      .      Basic Catheter Care  1. Always wash hands before and after handling your catheter.  2. Use soap and water to wash the area around your catheter.  3. Do this procedure twice a day.  4. Proper cleansing will help keep the area from becoming irritated or infected.    Leg Bag  This is a small plastic bag that collects urine draining from your catheter and then strapped around your thigh. It will need to be emptied when the bag is 1/2 to 3/4 full.     Large Drainage Bag  1. This bag is larger than the leg bag and holds more urine.  It is to be used while at home, especially at night.    2. Before you go to bed, change the leg bag to the large drainage bag.  3. Pinch off the catheter with your fingers and swab the connection between the catheter and leg bag with alcohol sponge.  4. Disconnect the leg  "bag and connect the large drainage bag to your catheter.  5. When you get into bed, arrange the drainage tubing so that it doesn t kink.  6. Be sure to keep the bag below the level of your bladder and allow enough slack for turning.    Cleaning Your Drainage Bags    1. Wash hands.  2. Using funnel or syringe, fill the bag half full with a solution of 1/2  vinegar and 1/2 water.  3. Shake bag, allowing mixture to cleanse inside of bag.  4. Empty out all vinegar and water mixture from your bag.  5. Hang bag to dry when not in use.  6. Clean your bags anytime you change them.      Helpful Hints  1. Always keep drainage bags below bladder level to insure adequate drainage.  2. Drink 4-6 glasses of water daily along with other fluids you normally drink to keep urine free of infection and / or clots.  3. If you notice no urine in your bag for 2 to 4 hours or you develop extreme discomfort in bladder area, your catheter maybe plugged.  Notify your doctor.  4. If you notice your urine becomes foul smelling and cloudy, notify your doctor.  Also notify your doctor if you develop fever or chills.  5. If you notice urine leaking around the outside of the catheter, check to be sure catheter or tubing is not kinked.  6. Don t use leg bag while in bed.            Pending Results     No orders found from 7/18/2018 to 7/21/2018.            Admission Information     Date & Time Provider Department Dept. Phone    7/20/2018 Joe Martinez MD [facility] PACU [phone]      Your Vitals Were     Blood Pressure Pulse Temperature Respirations Height Weight    152/72 48 96.5  F (35.8  C) (Temporal) 16 1.753 m (5' 9\") 80.8 kg (178 lb 3.2 oz)    Pulse Oximetry BMI (Body Mass Index)                99% 26.32 kg/m2          [facility] Information     [facility] gives you secure access to your electronic health record. If you see a primary care provider, you can also send messages to your care team and make appointments. If you have " questions, please call your primary care clinic.  If you do not have a primary care provider, please call [phone] and they will assist you.        Care EveryWhere ID     This is your Care EveryWhere ID. This could be used by other organizations to access your [facility] records  [id number]        Equal Access to Services     NATANAEL PALMER : Chris cummins Sonatalie, waaxda luqadaha, qaybta kaalmada janak, loretta ingram. So [facility] [phone].    ATENCIÓN: Si habla español, tiene a rivas disposición servicios gratuitos de asistencia lingüística. Llame al [phone].    We comply with applicable federal civil rights laws and [place] laws. We do not discriminate on the basis of race, color, national origin, age, disability, sex, sexual orientation, or gender identity.               Review of your medicines      START taking        Dose / Directions    cephALEXin 500 MG capsule   Commonly known as:  KEFLEX   Used for:  Benign prostatic hyperplasia with weak urinary stream        Dose:  500 mg   Take 1 capsule (500 mg) by mouth 2 times daily   Quantity:  14 capsule   Refills:  0       HYDROcodone-acetaminophen 5-325 MG per tablet   Commonly known as:  NORCO   Used for:  Benign prostatic hyperplasia with weak urinary stream        Dose:  1-2 tablet   Take 1-2 tablets by mouth every 4 hours as needed for severe pain (Moderate to Severe Pain)   Quantity:  15 tablet   Refills:  0         CONTINUE these medicines which have NOT CHANGED        Dose / Directions    CRESTOR 20 MG tablet   Generic drug:  rosuvastatin        Dose:  20 mg   Take 20 mg by mouth every evening   Refills:  0       FINASTERIDE PO        Dose:  5 mg   Take 5 mg by mouth every evening   Refills:  0       FLAXSEED OIL PO        Dose:  1400 mg   Take 1,400 mg by mouth daily   Refills:  0       fluticasone 50 MCG/ACT spray   Commonly known as:  FLONASE        Dose:  1 spray   Spray 1 spray into both nostrils  daily as needed   Refills:  0       LEVOCETIRIZINE DIHYDROCHLORIDE PO        Dose:  5 mg   Take 5 mg by mouth every evening   Refills:  0       LISINOPRIL PO        Dose:  20 mg   Take 20 mg by mouth daily   Refills:  0       LOPRESSOR PO        Dose:  12.5 mg   Take 12.5 mg by mouth 2 times daily (0.5 x 25 mg = 12.5 mg dose)   Refills:  0       OMEPRAZOLE PO        Dose:  20 mg   Take 20 mg by mouth every morning   Refills:  0       TYLENOL PO        Dose:  500 mg   Take 500 mg by mouth 2 times daily   Refills:  0       ZANTAC PO        Dose:  150 mg   Take 150 mg by mouth every evening   Refills:  0         STOP taking     aspirin 81 MG EC tablet                Where to get your medicines      These medications were sent to [facility] [address] [place] [address] [address] [address]  [address] [facility] [address]     Phone:  [phone]     cephALEXin 500 MG capsule         Some of these will need a paper prescription and others can be bought over the counter. Ask your nurse if you have questions.     Bring a paper prescription for each of these medications     HYDROcodone-acetaminophen 5-325 MG per tablet                Protect others around you: Learn how to safely use, store and throw away your medicines at www.disposemymeds.org.        ANTIBIOTIC INSTRUCTION     You've Been Prescribed an Antibiotic - Now What?  Your healthcare team thinks that you or your loved one might have an infection. Some infections can be treated with antibiotics, which are powerful, life-saving drugs. Like all medications, antibiotics have side effects and should only be used when necessary. There are some important things you should know about your antibiotic treatment.      Your healthcare team may run tests before you start taking an antibiotic.    Your team may take samples (e.g., from your blood, urine or other areas) to run tests to look for bacteria. These test can be important to  determine if you need an antibiotic at all and, if you do, which antibiotic will work best.      Within a few days, your healthcare team might change or even stop your antibiotic.    Your team may start you on an antibiotic while they are working to find out what is making you sick.    Your team might change your antibiotic because test results show that a different antibiotic would be better to treat your infection.    In some cases, once your team has more information, they learn that you do not need an antibiotic at all. They may find out that you don't have an infection, or that the antibiotic you're taking won't work against your infection. For example, an infection caused by a virus can't be treated with antibiotics. Staying on an antibiotic when you don't need it is more likely to be harmful than helpful.      You may experience side effects from your antibiotic.    Like all medications, antibiotics have side effects. Some of these can be serious.    Let you healthcare team know if you have any known allergies when you are admitted to the hospital.    One significant side effect of nearly all antibiotics is the risk of severe and sometimes deadly diarrhea caused by Clostridium difficile (C. Difficile). This occurs when a person takes antibiotics because some good germs are destroyed. Antibiotic use allows C. diificile to take over, putting patients at high risk for this serious infection.    As a patient or caregiver, it is important to understand your or your loved one's antibiotic treatment. It is especially important for caregivers to speak up when patients can't speak for themselves. Here are some important questions to ask your healthcare team.    What infection is this antibiotic treating and how do you know I have that infection?    What side effects might occur from this antibiotic?    How long will I need to take this antibiotic?    Is it safe to take this antibiotic with other medications or  supplements (e.g., vitamins) that I am taking?     Are there any special directions I need to know about taking this antibiotic? For example, should I take it with food?    How will I be monitored to know whether my infection is responding to the antibiotic?    What tests may help to make sure the right antibiotic is prescribed for me?      Information provided by:  www.cdc.gov/getsmart  U.S. Department of Health and Human Services  Centers for disease Control and Prevention  National Center for Emerging and Zoonotic Infectious Diseases  Division of Healthcare Quality Promotion        Information about OPIOIDS     PRESCRIPTION OPIOIDS: WHAT YOU NEED TO KNOW   We gave you an opioid (narcotic) pain medicine. It is important to manage your pain, but opioids are not always the best choice. You should first try all the other options your care team gave you. Take this medicine for as short a time (and as few doses) as possible.     These medicines have risks:    DO NOT drive when on new or higher doses of pain medicine. These medicines can affect your alertness and reaction times, and you could be arrested for driving under the influence (DUI). If you need to use opioids long-term, talk to your care team about driving.    DO NOT operate heave machinery    DO NOT do any other dangerous activities while taking these medicines.     DO NOT drink any alcohol while taking these medicines.      If the opioid prescribed includes acetaminophen, DO NOT take with any other medicines that contain acetaminophen. Read all labels carefully. Look for the word  acetaminophen  or  Tylenol.  Ask your pharmacist if you have questions or are unsure.    You can get addicted to pain medicines, especially if you have a history of addiction (chemical, alcohol or substance dependence). Talk to your care team about ways to reduce this risk.    Store your pills in a secure place, locked if possible. We will not replace any lost or stolen medicine.  If you don t finish your medicine, please throw away (dispose) as directed by your pharmacist. The [place] Pollution Control Agency has more information about safe disposal: https://www.pca.state.[place].us/living-green/managing-unwanted-medications.     All opioids tend to cause constipation. Drink plenty of water and eat foods that have a lot of fiber, such as fruits, vegetables, prune juice, apple juice and high-fiber cereal. Take a laxative (Miralax, milk of magnesia, Colace, Senna) if you don t move your bowels at least every other day.              Medication List: This is a list of all your medications and when to take them. Check marks below indicate your daily home schedule. Keep this list as a reference.      Medications           Morning Afternoon Evening Bedtime As Needed    cephALEXin 500 MG capsule   Commonly known as:  KEFLEX   Take 1 capsule (500 mg) by mouth 2 times daily                                CRESTOR 20 MG tablet   Take 20 mg by mouth every evening   Generic drug:  rosuvastatin                                FINASTERIDE PO   Take 5 mg by mouth every evening                                FLAXSEED OIL PO   Take 1,400 mg by mouth daily                                fluticasone 50 MCG/ACT spray   Commonly known as:  FLONASE   Spray 1 spray into both nostrils daily as needed                                HYDROcodone-acetaminophen 5-325 MG per tablet   Commonly known as:  NORCO   Take 1-2 tablets by mouth every 4 hours as needed for severe pain (Moderate to Severe Pain)                                LEVOCETIRIZINE DIHYDROCHLORIDE PO   Take 5 mg by mouth every evening                                LISINOPRIL PO   Take 20 mg by mouth daily                                LOPRESSOR PO   Take 12.5 mg by mouth 2 times daily (0.5 x 25 mg = 12.5 mg dose)                                OMEPRAZOLE PO   Take 20 mg by mouth every morning                                TYLENOL PO   Take 500 mg by mouth 2  times daily                                ZANTAC PO   Take 150 mg by mouth every evening

## 2018-07-20 NOTE — OR NURSING
Catheter care instructions completed with patient, wife and daughter. PNDS met, po per I&O sheet. Pt dressed, up in recliner and transported to Phase 2.

## 2018-07-20 NOTE — ANESTHESIA CARE TRANSFER NOTE
Patient: Robby Mccoy    Procedure(s):  CYSTOSCOPY TRANSURETHRAL RESECTION OF PROSTATE  - Wound Class: II-Clean Contaminated    Diagnosis: HEMATURIA, BPH  Diagnosis Additional Information: No value filed.    Anesthesia Type:   General, LMA     Note:  Airway :Face Mask  Patient transferred to:PACU  Comments: Pt exhibits spontaneous respirations, follows commands, suctioned, LMA removed, exchanging well, transferred to pacu with O2 @ 10L via mask, all monitors and alarms on, report to RN, VSS.Handoff Report: Identifed the Patient, Identified the Reponsible Provider, Reviewed the pertinent medical history, Discussed the surgical course, Reviewed Intra-OP anesthesia mangement and issues during anesthesia, Set expectations for post-procedure period and Allowed opportunity for questions and acknowledgement of understanding      Vitals: (Last set prior to Anesthesia Care Transfer)    CRNA VITALS  7/20/2018 0759 - 7/20/2018 0832      7/20/2018             NIBP: 161/67    Pulse: 54    NIBP Mean: 105    SpO2: 100 %    Resp Rate (observed): (!)  7                Electronically Signed By: RACHNA Mansfield CRNA  July 20, 2018  8:32 AM

## 2018-07-20 NOTE — BRIEF OP NOTE
[facility] Brief Operative Note    Pre-operative diagnosis: HEMATURIA, BPH   Post-operative diagnosis same   Procedure: Procedure(s):  CYSTOSCOPY TRANSURETHRAL RESECTION OF PROSTATE  - Wound Class: II-Clean Contaminated   Surgeon(s): Surgeon(s) and Role:     * Joe Martinez MD - Primary   Estimated blood loss: 5cc   Specimens: none   Findings: Prev turp. Residual adenoma

## 2018-07-20 NOTE — OP NOTE
Procedure Date: 07/20/2018      DATE OF SERVICE: 07/20/2018      PREOPERATIVE DIAGNOSES:     1.  Hematuria.   2.  Benign prostatic hypertrophy.      POSTOPERATIVE DIAGNOSIS:     1.  Hematuria.   2.  Benign prostatic hypertrophy.      PROCEDURE:  Laser-assisted transurethral resection of prostate.      ANESTHESIA:  General.      SURGEON:  Joe Martinez MD.      ESTIMATED BLOOD LOSS:  Less  than 5 mL.      SUMMARY OF FINDINGS:  Residual adenoma right lobe.  Tight bladder neck.      BRIEF HISTORY AND PHYSICAL:  the patient is a 77-year-old male with a history of BPH who underwent a TURP approximately 7-8 years ago in [place].  The patient has had repeated episodes of gross hematuria.  He has been on aspirin.  He also was placed on finasteride.  Because of his continued episodes of hematuria, he underwent diagnostic cystoscopy.  Upper tracts were normal.  Bladder was negative.  There is evidence of residual adenoma, especially involving the right lobe of the prostate and a wide bore bladder neck contracture.  Also noted was that his previous laser TURP was very close to his verumontanum.  The patient received information concerning alternatives for treatment.  Risks and alternatives, he elected to proceed.      DESCRIPTION OF PROCEDURE:  Proper permits were obtained and signed.  The patient was taken to the operating room, underwent general anesthesia, he was placed in dorsal lithotomy position.  The skin was prepped in the usual sterile fashion.  A 23-[place] Islander continuous flow laser scope was visually passed the urethra.  The patient's sphincter was a little bit stiff.  There was evidence of significant resection on the left side of the verumontanum.  He had a rather large right lobe, which was extended across the midline.  Also a tight bladder neck.  Bladder was mildly trabeculated, otherwise was negative.      The patient underwent careful laser vaporization of the right lobe of the prostate starting from the bladder  neck and extending to the level just proximal to the verumontanum.  Resection was taken to a nice barrel like defect was noted.  He also underwent a resection of some residual tissue on the roof and minimal on the left.  He then underwent incision of his bladder neck at the 3, 6 and 9 o'clock positions.  This opened the bladder neck nicely.  A 16-Luxembourger coude Carpenter catheter was placed and irrigated clear.  B&O suppository was placed.      ESTIMATED BLOOD LOSS:  Less than 5 mL.        Taken to the recovery room in stable condition.         JOE REAVES MD             D: 2018   T: 2018   MT: UMANG      Name:     IMMANUEL GUTIERREZ   MRN:      [id number]        Account:        [id number]   :      1940           Procedure Date: 2018      Document: [id number]       cc: Joe Lynn MD

## 2018-07-20 NOTE — PROGRESS NOTES
Admission medication history interview status for the 7/20/2018  admission is complete. See EPIC admission navigator for prior to admission medications     Medication history source reliability:Good    Medication history interview source(s):Patient    Medication history resources (including written lists, pill bottles, clinic record):None    Primary pharmacy.[facility]    Additional medication history information not noted on PTA med list :None    Time spent in this activity: 45 minutes    Prior to Admission medications    Medication Sig Last Dose Taking? Auth Provider   Acetaminophen (TYLENOL PO) Take 500 mg by mouth 2 times daily  7/19/2018 at pm Yes Reported, Patient   aspirin EC 81 MG EC tablet Take 81 mg by mouth daily  7/6/2018 Yes Reported, Patient   FINASTERIDE PO Take 5 mg by mouth every evening  7/19/2018 at 1800 Yes Reported, Patient   Flaxseed, Linseed, (FLAXSEED OIL PO) Take 1,400 mg by mouth daily  7/6/2018 Yes Reported, Patient   fluticasone (FLONASE) 50 MCG/ACT spray Spray 1 spray into both nostrils daily as needed  7/19/2018 at prn Yes Reported, Patient   LEVOCETIRIZINE DIHYDROCHLORIDE PO Take 5 mg by mouth every evening 7/19/2018 at 1800 Yes Reported, Patient   LISINOPRIL PO Take 20 mg by mouth daily 7/19/2018 at am Yes Reported, Patient   Metoprolol Tartrate (LOPRESSOR PO) Take 12.5 mg by mouth 2 times daily (0.5 x 25 mg = 12.5 mg dose) 7/19/2018 at 1800 Yes Reported, Patient   OMEPRAZOLE PO Take 20 mg by mouth every morning 7/19/2018 at am Yes Reported, Patient   Ranitidine HCl (ZANTAC PO) Take 150 mg by mouth every evening  7/19/2018 at 1800 Yes Reported, Patient   rosuvastatin (CRESTOR) 20 MG tablet Take 20 mg by mouth every evening  7/19/2018 at 1800 Yes Reported, Patient

## 2018-07-20 NOTE — DISCHARGE INSTRUCTIONS
Same Day Surgery Discharge Instructions for  Sedation and General Anesthesia       It's not unusual to feel dizzy, light-headed or faint for up to 24 hours after surgery or while taking pain medication.  If you have these symptoms: sit for a few minutes before standing and have someone assist you when you get up to walk or use the bathroom.      You should rest and relax for the next 24 hours. We recommend you make arrangements to have an adult stay with you for at least 24 hours after your discharge.  Avoid hazardous and strenuous activity.      DO NOT DRIVE any vehicle or operate mechanical equipment for 24 hours following the end of your surgery.  Even though you may feel normal, your reactions may be affected by the medication you have received.      Do not drink alcoholic beverages for 24 hours following surgery.       Slowly progress to your regular diet as you feel able. It's not unusual to feel nauseated and/or vomit after receiving anesthesia.  If you develop these symptoms, drink clear liquids (apple juice, ginger ale, broth, 7-up, etc. ) until you feel better.  If your nausea and vomiting persists for 24 hours, please notify your surgeon.        All narcotic pain medications, along with inactivity and anesthesia, can cause constipation. Drinking plenty of liquids and increasing fiber intake will help.      For any questions of a medical nature, call your surgeon.      Do not make important decisions for 24 hours.      If you had general anesthesia, you may have a sore throat for a couple of days related to the breathing tube used during surgery.  You may use Cepacol lozenges to help with this discomfort.  If it worsens or if you develop a fever, contact your surgeon.       If you feel your pain is not well managed with the pain medications prescribed by your surgeon, please contact your surgeon's office to let them know so they can address your concerns.     Discharge Instructions for Transurethral  Resection of a Bladder Tumor        Diet:     Diet as tolerated.    Drink at least 6 glasses of liquid a day-at least 3 glasses should be water.  Activity:     Avoid heavy lifting or strenuous activity     Increase activity as directed by your surgeon     Short walks and stair climbing are OK     Showering is OK  Care after surgery:    Do not hold urine in your bladder. Always empty your bladder when you have the urge to urinate .     Do not strain to have a bowel movement. If you constipated, take an over the counter stool softener until stools become normal or soft.  This may take several weeks.     Do not drive a car or have intercourse until cleared by your doctor.  It is not unusual to pass small clots or to have red-tinged urine. If this occurs, decrease activity, and increase your fluid intake. Generally, the urine will clear of blood within a day or two.    Call your physician if you have the following signs or symptoms:    Painful urination or unable to urinate.    Loss of bladder control or increased frequency of urination.    Have chills or temperature greater than 101 F.  Excessive blood in your urine     DISCHARGE INSTRUCTIONS FOR   CATHETER CARE AT HOME      .      Basic Catheter Care  1. Always wash hands before and after handling your catheter.  2. Use soap and water to wash the area around your catheter.  3. Do this procedure twice a day.  4. Proper cleansing will help keep the area from becoming irritated or infected.    Leg Bag  This is a small plastic bag that collects urine draining from your catheter and then strapped around your thigh. It will need to be emptied when the bag is 1/2 to 3/4 full.     Large Drainage Bag  1. This bag is larger than the leg bag and holds more urine.  It is to be used while at home, especially at night.    2. Before you go to bed, change the leg bag to the large drainage bag.  3. Pinch off the catheter with your fingers and swab the connection between the catheter  and leg bag with alcohol sponge.  4. Disconnect the leg bag and connect the large drainage bag to your catheter.  5. When you get into bed, arrange the drainage tubing so that it doesn t kink.  6. Be sure to keep the bag below the level of your bladder and allow enough slack for turning.    Cleaning Your Drainage Bags    1. Wash hands.  2. Using funnel or syringe, fill the bag half full with a solution of 1/2  vinegar and 1/2 water.  3. Shake bag, allowing mixture to cleanse inside of bag.  4. Empty out all vinegar and water mixture from your bag.  5. Hang bag to dry when not in use.  6. Clean your bags anytime you change them.      Helpful Hints  1. Always keep drainage bags below bladder level to insure adequate drainage.  2. Drink 4-6 glasses of water daily along with other fluids you normally drink to keep urine free of infection and / or clots.  3. If you notice no urine in your bag for 2 to 4 hours or you develop extreme discomfort in bladder area, your catheter maybe plugged.  Notify your doctor.  4. If you notice your urine becomes foul smelling and cloudy, notify your doctor.  Also notify your doctor if you develop fever or chills.  5. If you notice urine leaking around the outside of the catheter, check to be sure catheter or tubing is not kinked.  6. Don t use leg bag while in bed.

## 2018-07-27 ENCOUNTER — DOCUMENTATION ONLY (OUTPATIENT)
Dept: OTHER | Facility: CLINIC | Age: 78
End: 2018-07-27

## 2019-06-06 ENCOUNTER — HOSPITAL ENCOUNTER (EMERGENCY)
Facility: CLINIC | Age: 79
Discharge: HOME | End: 2019-06-06
Attending: EMERGENCY MEDICINE | Admitting: EMERGENCY MEDICINE
Payer: COMMERCIAL

## 2019-06-06 VITALS
HEART RATE: 57 BPM | DIASTOLIC BLOOD PRESSURE: 73 MMHG | TEMPERATURE: 97.7 F | RESPIRATION RATE: 17 BRPM | SYSTOLIC BLOOD PRESSURE: 146 MMHG | OXYGEN SATURATION: 97 %

## 2019-06-06 DIAGNOSIS — K59.00 CONSTIPATION, UNSPECIFIED CONSTIPATION TYPE: ICD-10-CM

## 2019-06-06 PROCEDURE — 25000132 ZZH RX MED GY IP 250 OP 250 PS 637: Mod: GY | Performed by: EMERGENCY MEDICINE

## 2019-06-06 PROCEDURE — 99283 EMERGENCY DEPT VISIT LOW MDM: CPT

## 2019-06-06 PROCEDURE — A9270 NON-COVERED ITEM OR SERVICE: HCPCS | Mod: GY | Performed by: EMERGENCY MEDICINE

## 2019-06-06 RX ADMIN — MAGNESIUM CITRATE 286 ML: 1.75 LIQUID ORAL at 08:42

## 2019-06-06 ASSESSMENT — ENCOUNTER SYMPTOMS
CONSTIPATION: 1
ABDOMINAL PAIN: 0
BLOOD IN STOOL: 1

## 2019-06-06 NOTE — ED TRIAGE NOTES
Arrives with c/o constipation. Monday was LBP, diarrhea and took imodium d/t flight and hasn't been able to have a BM since. Some bleeding r/t straining. Denies pain, just reports sensation of having to go. Normally has a bm every day.

## 2019-06-06 NOTE — ED PROVIDER NOTES
History     Chief Complaint:  Constipation     HPI   Robby Mccoy is a 78 year old male with a history of hemorrhoids who presents with constipation. The patient notes that he has not had a bowel movement since 6/3. The patient notes he did take Imodium on Monday as he had some mild diarrhea at that time. The patient notes he had a quarter sized bowel movement yesterday. He states he was straining today and when he looked down he saw blood in the toilet and when he wiped. The patient notes that he feels like he needs to have a bowel movement. The patient has had 2 Ducolax this morning. He denies any abdominal pain. The patient has had hemorrhoid surgery when he was younger and a removal of an internal growth in his abdomen about 10 years ago. The patient has not had any problems with his hemorrhoids since his surgery many years ago. The patient denies any use of anticoagulation besides his daily baby Asprin.     Allergies:  Clindamycin Base     Medications:    Flonase  Finasteride  Losartan  Omeprazole  Metoprolol  Ranitidine  Crestor   Tylenol  Asprin 81 mg     Past Medical History:    Allergic rhinitis   ASHD (arteriosclerotic heart disease)   Carotid stenosis, bilateral  CAD   Cataracts, bilateral   Cervical spondylosis   Displacement of lumbar intervertebral disc without myelopathy   Diverticulum of esophagus   Gastroesophageal reflux disease   Hematuria   Hemorrhoid  Hiatal hernia   Hyperbilirubinemia   Hyperlipidemia   Hypertension   Hypertrophy of prostate with urinary obstruction   Primary pulmonary coccidioidomycosis    Proteinuria  Sensorineural hearing loss, asymmetrical  Spinal stenosis without sciatica    Subjective tinnitus   Tendonitis   TIA (transient ischemic attack)  TMJ (dislocation of temporomandibular joint)     Past Surgical History:    Arthroscopy knee  Bypass graft artery  Carpal tunnel release rt/lt  Cholecystectomy  discectomy  Repair of eye after  accident  Hemorrhoidectomy  Laser KTP green light photoselective vaporization prostate   Neck surgery   Orthopedic surgery   Release trigger finger  TURP  XR Shoulder Surgery DANICA Right    Family History:    Brother: macular degeneration  Mother: cancer    Social History:  The patient was accompanied to the ED by wife.  Smoking Status: former smoker  Smokeless Tobacco: Never Used  Alcohol Use: Positive  Marital Status:        Review of Systems   Gastrointestinal: Positive for blood in stool and constipation. Negative for abdominal pain.   All other systems reviewed and are negative.    Physical Exam     Patient Vitals for the past 24 hrs:   BP Temp Temp src Pulse Heart Rate Resp SpO2   06/06/19 0821 146/73 97.7  F (36.5  C) Oral -- 56 17 97 %   06/06/19 0820 146/73 -- -- 57 -- -- --     Physical Exam  Vital signs and nursing notes reviewed.     Constitutional: laying on gurney appears comfortable  HENT: Oropharynx is clear and moist  Eyes: Conjunctivae are normal bilaterally. Pupils equal  Neck: normal range of motion  Cardiovascular: Normal rate, regular rhythm, normal heart sounds.   Pulmonary/Chest: Effort normal and breath sounds normal. No respiratory distress.   Abdominal: Soft. Bowel sounds are normal. No tenderness to palpation or reproducible pain. No rebound or guarding. Nondistended.   Rectal: No signs of external bleeding. No anal fissure or hemorrhoid. Digital rectal exam revealed hard stool in upper rectum. No mass noted. No blood palpated.   Musculoskeletal: No joint swelling or edema.   Neurological: Alert and oriented. No focal weakness  Skin: Skin is warm and dry. No rash noted.   Psych: normal affect  Emergency Department Course   Interventions:  0842 Pink Lady enema 286 mL Rectal    Emergency Department Course:  Nursing notes and vitals reviewed.    0827 I performed an exam of the patient as documented above. I preformed a chaperoned rectal exam as noted above.     1041 I returned to check  on the patient. He states that he feels improved after his bowel movements and feels comfortable with discharge.     Impression & Plan      Medical Decision Making:  The patient is a pleasant 78 year old male who presents with constipation. His examination did show hard stool in the rectal vault. He did not have abdominal pain of other concerning findings on physical exam therefore I do not feel that imaging or labs were indicated. The patient was given an enema with good results in the ED. The patient had no bloody stools or other concerning findings, so I felt he was safe for discharge home. The patient understands reasons for return and was discharged home in good condition.     Diagnosis:    ICD-10-CM    1. Constipation, unspecified constipation type K59.00       Disposition:   The patient is discharged to home.    Discharge Medications:  No discharge medication.     Scribe Disclosure:  I, Michaela Spencer, am serving as a scribe at 8:19 AM on 6/6/2019 to document services personally performed by Sean Mcbride MD based on my observations and the provider's statements to me.  [facility] EMERGENCY DEPARTMENT       Sean Mcbride MD  06/06/19 3113

## 2019-06-06 NOTE — ED AVS SNAPSHOT
[facility] Emergency Department  [address]  [facility] [address]  Phone:  [phone]  Fax:  [phone]                                    Robby Mccoy   MRN: [id number]    Department:  [facility] Emergency Department   Date of Visit:  6/6/2019           After Visit Summary Signature Page    I have received my discharge instructions, and my questions have been answered. I have discussed any challenges I see with this plan with the nurse or doctor.    ..........................................................................................................................................  Patient/Patient Representative Signature      ..........................................................................................................................................  Patient Representative Print Name and Relationship to Patient    ..................................................               ................................................  Date                                   Time    ..........................................................................................................................................  Reviewed by Signature/Title    ...................................................              ..............................................  Date                                               Time          22EPIC Rev 08/18

## 2019-09-27 ENCOUNTER — HEALTH MAINTENANCE LETTER (OUTPATIENT)
Age: 79
End: 2019-09-27

## 2020-03-15 ENCOUNTER — HEALTH MAINTENANCE LETTER (OUTPATIENT)
Age: 80
End: 2020-03-15

## 2021-01-09 ENCOUNTER — HEALTH MAINTENANCE LETTER (OUTPATIENT)
Age: 81
End: 2021-01-09

## 2021-05-08 ENCOUNTER — HEALTH MAINTENANCE LETTER (OUTPATIENT)
Age: 81
End: 2021-05-08

## 2021-07-11 DIAGNOSIS — Z11.59 ENCOUNTER FOR SCREENING FOR OTHER VIRAL DISEASES: ICD-10-CM

## 2021-09-15 RX ORDER — LOSARTAN POTASSIUM 50 MG/1
50 TABLET ORAL DAILY
COMMUNITY
Start: 2020-12-18

## 2021-09-15 RX ORDER — MELATONIN 10 MG
CAPSULE ORAL AT BEDTIME
COMMUNITY
Start: 2021-06-25

## 2021-09-15 RX ORDER — DESONIDE 0.5 MG/G
OINTMENT TOPICAL
COMMUNITY
Start: 2021-06-09

## 2021-09-15 RX ORDER — DOXAZOSIN 2 MG/1
2 TABLET ORAL AT BEDTIME
COMMUNITY
Start: 2021-05-11

## 2021-09-20 ENCOUNTER — ANESTHESIA EVENT (OUTPATIENT)
Dept: SURGERY | Facility: CLINIC | Age: 81
End: 2021-09-20
Payer: COMMERCIAL

## 2021-09-20 ENCOUNTER — HOSPITAL ENCOUNTER (OUTPATIENT)
Facility: CLINIC | Age: 81
Discharge: HOME | End: 2021-09-20
Attending: INTERNAL MEDICINE | Admitting: INTERNAL MEDICINE
Payer: COMMERCIAL

## 2021-09-20 ENCOUNTER — ANESTHESIA (OUTPATIENT)
Dept: SURGERY | Facility: CLINIC | Age: 81
End: 2021-09-20
Payer: COMMERCIAL

## 2021-09-20 VITALS
HEIGHT: 69 IN | DIASTOLIC BLOOD PRESSURE: 57 MMHG | TEMPERATURE: 98 F | WEIGHT: 165 LBS | RESPIRATION RATE: 16 BRPM | SYSTOLIC BLOOD PRESSURE: 122 MMHG | BODY MASS INDEX: 24.44 KG/M2 | HEART RATE: 54 BPM | OXYGEN SATURATION: 98 %

## 2021-09-20 LAB — UPPER GI ENDOSCOPY: NORMAL

## 2021-09-20 PROCEDURE — 88305 TISSUE EXAM BY PATHOLOGIST: CPT | Mod: TC | Performed by: INTERNAL MEDICINE

## 2021-09-20 PROCEDURE — 272N000001 HC OR GENERAL SUPPLY STERILE: Performed by: INTERNAL MEDICINE

## 2021-09-20 PROCEDURE — 710N000012 HC RECOVERY PHASE 2, PER MINUTE: Performed by: INTERNAL MEDICINE

## 2021-09-20 PROCEDURE — 88305 TISSUE EXAM BY PATHOLOGIST: CPT | Mod: 26 | Performed by: PATHOLOGY

## 2021-09-20 PROCEDURE — 360N000075 HC SURGERY LEVEL 2, PER MIN: Performed by: INTERNAL MEDICINE

## 2021-09-20 PROCEDURE — 250N000011 HC RX IP 250 OP 636: Performed by: NURSE ANESTHETIST, CERTIFIED REGISTERED

## 2021-09-20 PROCEDURE — 250N000009 HC RX 250: Performed by: NURSE ANESTHETIST, CERTIFIED REGISTERED

## 2021-09-20 PROCEDURE — 999N000141 HC STATISTIC PRE-PROCEDURE NURSING ASSESSMENT: Performed by: INTERNAL MEDICINE

## 2021-09-20 PROCEDURE — 258N000003 HC RX IP 258 OP 636: Performed by: NURSE ANESTHETIST, CERTIFIED REGISTERED

## 2021-09-20 PROCEDURE — 370N000017 HC ANESTHESIA TECHNICAL FEE, PER MIN: Performed by: INTERNAL MEDICINE

## 2021-09-20 RX ORDER — ONDANSETRON 2 MG/ML
4 INJECTION INTRAMUSCULAR; INTRAVENOUS EVERY 6 HOURS PRN
Status: DISCONTINUED | OUTPATIENT
Start: 2021-09-20 | End: 2021-09-20 | Stop reason: HOSPADM

## 2021-09-20 RX ORDER — FLUMAZENIL 0.1 MG/ML
0.2 INJECTION, SOLUTION INTRAVENOUS
Status: DISCONTINUED | OUTPATIENT
Start: 2021-09-20 | End: 2021-09-20 | Stop reason: HOSPADM

## 2021-09-20 RX ORDER — NALOXONE HYDROCHLORIDE 0.4 MG/ML
0.2 INJECTION, SOLUTION INTRAMUSCULAR; INTRAVENOUS; SUBCUTANEOUS
Status: DISCONTINUED | OUTPATIENT
Start: 2021-09-20 | End: 2021-09-20 | Stop reason: HOSPADM

## 2021-09-20 RX ORDER — LABETALOL HYDROCHLORIDE 5 MG/ML
10 INJECTION, SOLUTION INTRAVENOUS
Status: DISCONTINUED | OUTPATIENT
Start: 2021-09-20 | End: 2021-09-20 | Stop reason: HOSPADM

## 2021-09-20 RX ORDER — SODIUM CHLORIDE, SODIUM LACTATE, POTASSIUM CHLORIDE, CALCIUM CHLORIDE 600; 310; 30; 20 MG/100ML; MG/100ML; MG/100ML; MG/100ML
INJECTION, SOLUTION INTRAVENOUS CONTINUOUS
Status: DISCONTINUED | OUTPATIENT
Start: 2021-09-20 | End: 2021-09-20 | Stop reason: HOSPADM

## 2021-09-20 RX ORDER — ONDANSETRON 4 MG/1
4 TABLET, ORALLY DISINTEGRATING ORAL EVERY 30 MIN PRN
Status: DISCONTINUED | OUTPATIENT
Start: 2021-09-20 | End: 2021-09-20 | Stop reason: HOSPADM

## 2021-09-20 RX ORDER — LIDOCAINE 40 MG/G
CREAM TOPICAL
Status: DISCONTINUED | OUTPATIENT
Start: 2021-09-20 | End: 2021-09-20 | Stop reason: HOSPADM

## 2021-09-20 RX ORDER — PROPOFOL 10 MG/ML
INJECTION, EMULSION INTRAVENOUS CONTINUOUS PRN
Status: DISCONTINUED | OUTPATIENT
Start: 2021-09-20 | End: 2021-09-20

## 2021-09-20 RX ORDER — ONDANSETRON 2 MG/ML
4 INJECTION INTRAMUSCULAR; INTRAVENOUS
Status: DISCONTINUED | OUTPATIENT
Start: 2021-09-20 | End: 2021-09-20 | Stop reason: HOSPADM

## 2021-09-20 RX ORDER — NALOXONE HYDROCHLORIDE 0.4 MG/ML
0.4 INJECTION, SOLUTION INTRAMUSCULAR; INTRAVENOUS; SUBCUTANEOUS
Status: DISCONTINUED | OUTPATIENT
Start: 2021-09-20 | End: 2021-09-20 | Stop reason: HOSPADM

## 2021-09-20 RX ORDER — ONDANSETRON 2 MG/ML
INJECTION INTRAMUSCULAR; INTRAVENOUS PRN
Status: DISCONTINUED | OUTPATIENT
Start: 2021-09-20 | End: 2021-09-20

## 2021-09-20 RX ORDER — ACETAMINOPHEN 325 MG/1
975 TABLET ORAL
Status: DISCONTINUED | OUTPATIENT
Start: 2021-09-20 | End: 2021-09-20 | Stop reason: HOSPADM

## 2021-09-20 RX ORDER — SODIUM CHLORIDE, SODIUM LACTATE, POTASSIUM CHLORIDE, CALCIUM CHLORIDE 600; 310; 30; 20 MG/100ML; MG/100ML; MG/100ML; MG/100ML
INJECTION, SOLUTION INTRAVENOUS CONTINUOUS PRN
Status: DISCONTINUED | OUTPATIENT
Start: 2021-09-20 | End: 2021-09-20

## 2021-09-20 RX ORDER — GLYCOPYRROLATE 0.2 MG/ML
INJECTION, SOLUTION INTRAMUSCULAR; INTRAVENOUS PRN
Status: DISCONTINUED | OUTPATIENT
Start: 2021-09-20 | End: 2021-09-20

## 2021-09-20 RX ORDER — HYDRALAZINE HYDROCHLORIDE 20 MG/ML
2.5-5 INJECTION INTRAMUSCULAR; INTRAVENOUS EVERY 10 MIN PRN
Status: DISCONTINUED | OUTPATIENT
Start: 2021-09-20 | End: 2021-09-20 | Stop reason: HOSPADM

## 2021-09-20 RX ORDER — PROCHLORPERAZINE MALEATE 5 MG
5 TABLET ORAL EVERY 6 HOURS PRN
Status: DISCONTINUED | OUTPATIENT
Start: 2021-09-20 | End: 2021-09-20 | Stop reason: HOSPADM

## 2021-09-20 RX ORDER — PROPOFOL 10 MG/ML
INJECTION, EMULSION INTRAVENOUS PRN
Status: DISCONTINUED | OUTPATIENT
Start: 2021-09-20 | End: 2021-09-20

## 2021-09-20 RX ORDER — ONDANSETRON 2 MG/ML
4 INJECTION INTRAMUSCULAR; INTRAVENOUS EVERY 30 MIN PRN
Status: DISCONTINUED | OUTPATIENT
Start: 2021-09-20 | End: 2021-09-20 | Stop reason: HOSPADM

## 2021-09-20 RX ORDER — ALBUTEROL SULFATE 0.83 MG/ML
2.5 SOLUTION RESPIRATORY (INHALATION) EVERY 4 HOURS PRN
Status: DISCONTINUED | OUTPATIENT
Start: 2021-09-20 | End: 2021-09-20 | Stop reason: HOSPADM

## 2021-09-20 RX ORDER — ONDANSETRON 4 MG/1
4 TABLET, ORALLY DISINTEGRATING ORAL EVERY 6 HOURS PRN
Status: DISCONTINUED | OUTPATIENT
Start: 2021-09-20 | End: 2021-09-20 | Stop reason: HOSPADM

## 2021-09-20 RX ADMIN — PROPOFOL 20 MG: 10 INJECTION, EMULSION INTRAVENOUS at 09:48

## 2021-09-20 RX ADMIN — PROPOFOL 100 MCG/KG/MIN: 10 INJECTION, EMULSION INTRAVENOUS at 09:47

## 2021-09-20 RX ADMIN — GLYCOPYRROLATE 0.2 MG: 0.2 INJECTION, SOLUTION INTRAMUSCULAR; INTRAVENOUS at 09:42

## 2021-09-20 RX ADMIN — SODIUM CHLORIDE, POTASSIUM CHLORIDE, SODIUM LACTATE AND CALCIUM CHLORIDE: 600; 310; 30; 20 INJECTION, SOLUTION INTRAVENOUS at 09:14

## 2021-09-20 RX ADMIN — ONDANSETRON HYDROCHLORIDE 4 MG: 2 INJECTION, SOLUTION INTRAVENOUS at 09:42

## 2021-09-20 RX ADMIN — PROPOFOL 40 MG: 10 INJECTION, EMULSION INTRAVENOUS at 09:51

## 2021-09-20 ASSESSMENT — MIFFLIN-ST. JEOR: SCORE: 1438.82

## 2021-09-20 NOTE — PRE-PROCEDURE
"Pre-Endoscopy History and Physical     Robyb Mccoy MRN# [id number]   YOB: 1940 Age: 81 year old     Date of Procedure: 9/20/2021  Primary care provider: Efren Lynn  Type of Endoscopy: esophagogastroduodenoscopy (upper GI endoscopy)  Reason for Procedure: Bloating and gas  Type of Anesthesia Anticipated: MAC    HPI:    Robby is a 81 year old male who will be undergoing the above procedure.      A history and physical has been performed. The patient's medications and allergies have been reviewed. The risks and benefits of the procedure and the sedation options and risks were discussed with the patient.  All questions were answered and informed consent was obtained.      Allergies   Allergen Reactions     Clindamycin Base Rash        Current Facility-Administered Medications   Medication     lactated ringers infusion     lidocaine (LMX4) cream     lidocaine (LMX4) cream     lidocaine 1 % 0.1-1 mL     lidocaine 1 % 0.1-1 mL     ondansetron (ZOFRAN) injection 4 mg     sodium chloride (PF) 0.9% PF flush 3 mL     sodium chloride (PF) 0.9% PF flush 3 mL     sodium chloride (PF) 0.9% PF flush 3 mL     sodium chloride (PF) 0.9% PF flush 3 mL     Facility-Administered Medications Ordered in Other Encounters   Medication     No abx ordered pre-op       Patient Active Problem List   Diagnosis     Spinal stenosis, lumbar region, without neurogenic claudication     Right-sided low back pain without sciatica        Past Medical History:   Diagnosis Date     Allergic rhinitis      ASHD (arteriosclerotic heart disease)      Carotid stenosis, bilateral      Cataracts, bilateral      Cervical spondylosis      Displacement of lumbar intervertebral disc without myelopathy      Diverticulum of esophagus, acquired      Gastroesophageal reflux disease      Hematuria      Hiatal hernia      History of blood transfusion     after a surgery, pt does not remember the specific surgery. \"Many years ago\"     " Hyperbilirubinemia      Hyperlipidemia      Hypertension      Hypertrophy of prostate with urinary obstruction      Other chest pain      Primary pulmonary coccidioidomycosis (H)      Proteinuria      Sensorineural hearing loss, asymmetrical      Subjective tinnitus      Tendonitis      TIA (transient ischemic attack)      TMJ (dislocation of temporomandibular joint)         Past Surgical History:   Procedure Laterality Date     ARTHROSCOPY KNEE       BYPASS GRAFT ARTERY CORONARY      second CABG done a few years later     CARPAL TUNNEL RELEASE RT/LT       CHOLECYSTECTOMY       DISKECTOMY, LUMBAR, SINGLE SP      Cervical--surgery x3, Lumbar x2     ESOPHAGOSCOPY       EYE SURGERY      Repair of eye after accident       HEMORRHOIDECTOMY       LASER KTP GREEN LIGHT PHOTOSELECTIVE VAPORIZATION PROSTATE N/A 2018    Procedure: LASER KTP GREEN LIGHT PHOTOSELECTIVE VAPORIZATION PROSTATE;  CYSTOSCOPY TRANSURETHRAL RESECTION OF PROSTATE ;  Surgeon: Joe Martinez MD;  Location: [facility] OR     NECK SURGERY       ORTHOPEDIC SURGERY       RELEASE TRIGGER FINGER       TURP       XR SHOULDER SURGERY DANICA RIGHT         Social History     Tobacco Use     Smoking status: Former Smoker     Quit date: 1988     Years since quittin.7     Smokeless tobacco: Never Used   Substance Use Topics     Alcohol use: Not Currently       History reviewed. No pertinent family history.         Medications:     Medications Prior to Admission   Medication Sig Dispense Refill Last Dose     Acetaminophen (TYLENOL PO) Take 500 mg by mouth 2 times daily    Past Week at Unknown time     Apoaequorin (PREVAGEN PO) Take by mouth daily   2021 at Unknown time     aspirin (ASA) 81 MG EC tablet Take 81 mg by mouth   Past Week at Unknown time     Cyanocobalamin 5000 MCG SUBL Place under the tongue daily    Past Month at Unknown time     desonide (DESOWEN) 0.05 % external ointment    Past Month at Unknown time     doxazosin (CARDURA) 2 MG tablet  "Take 2 mg by mouth At Bedtime    9/19/2021 at Unknown time     Flaxseed, Linseed, (FLAXSEED OIL PO) Take 1,400 mg by mouth daily    Past Week at Unknown time     losartan (COZAAR) 50 MG tablet Take 50 mg by mouth daily    9/19/2021 at Unknown time     Melatonin 10 MG CAPS Take by mouth At Bedtime    Past Week at Unknown time     OMEPRAZOLE PO Take 20 mg by mouth every morning   9/19/2021 at Unknown time     rosuvastatin (CRESTOR) 20 MG tablet Take 20 mg by mouth every evening    9/19/2021 at Unknown time       Scheduled Medications:    sodium chloride (PF)  3 mL Intracatheter Q8H     sodium chloride (PF)  3 mL Intracatheter Q8H       PRN:  lidocaine 4%, lidocaine 4%, lidocaine (buffered or not buffered), lidocaine (buffered or not buffered), ondansetron, sodium chloride (PF), sodium chloride (PF)    PHYSICAL EXAM:   BP (!) 151/67 (BP Location: Left arm)   Pulse 54   Temp 98.3  F (36.8  C) (Temporal)   Resp 20   Ht 1.753 m (5' 9\")   Wt 74.8 kg (165 lb)   SpO2 98%   BMI 24.37 kg/m   Estimated body mass index is 24.37 kg/m  as calculated from the following:    Height as of this encounter: 1.753 m (5' 9\").    Weight as of this encounter: 74.8 kg (165 lb).   RESP: lungs clear to auscultation - no rales, rhonchi or wheezes  CV: regular rates and rhythm    IMPRESSION   ASA Class 3 - Severe systemic disease, but not incapacitating      Signed Electronically by: Lionel Bzazi MD  September 20, 2021    .            "

## 2021-09-20 NOTE — ANESTHESIA POSTPROCEDURE EVALUATION
Patient: Robby Mccoy    Procedure(s):  ESOPHAGOGASTRODUODENOSCOPY with biopsy    Diagnosis:Abdominal bloating [R14.0]  Diagnosis Additional Information: No value filed.    Anesthesia Type:  MAC    Note:  Disposition: Outpatient   Postop Pain Control: Uneventful            Sign Out: Well controlled pain   PONV: No   Neuro/Psych: Uneventful            Sign Out: Acceptable/Baseline neuro status   Airway/Respiratory: Uneventful            Sign Out: Acceptable/Baseline resp. status   CV/Hemodynamics: Uneventful            Sign Out: Acceptable CV status; No obvious hypovolemia; No obvious fluid overload   Other NRE: NONE   DID A NON-ROUTINE EVENT OCCUR? No           Last vitals:  Vitals Value Taken Time   /57 09/20/21 1019   Temp 98  F (36.7  C) 09/20/21 1019   Pulse     Resp 16 09/20/21 1005   SpO2 98 % 09/20/21 1019       Electronically Signed By: Karo Rhodes MD  September 20, 2021  12:46 PM

## 2021-09-20 NOTE — ANESTHESIA PREPROCEDURE EVALUATION
"Anesthesia Pre-Procedure Evaluation    Patient: Robby Mccoy   MRN: [id number] : 1940        Preoperative Diagnosis: Abdominal bloating [R14.0]   Procedure : Procedure(s):  ESOPHAGOGASTRODUODENOSCOPY (EGD) ([facility])     Past Medical History:   Diagnosis Date     Allergic rhinitis      ASHD (arteriosclerotic heart disease)      Carotid stenosis, bilateral      Cataracts, bilateral      Cervical spondylosis      Displacement of lumbar intervertebral disc without myelopathy      Diverticulum of esophagus, acquired      Gastroesophageal reflux disease      Hematuria      Hiatal hernia      History of blood transfusion     after a surgery, pt does not remember the specific surgery. \"Many years ago\"     Hyperbilirubinemia      Hyperlipidemia      Hypertension      Hypertrophy of prostate with urinary obstruction      Other chest pain      Primary pulmonary coccidioidomycosis (H)      Proteinuria      Sensorineural hearing loss, asymmetrical      Subjective tinnitus      Tendonitis      TIA (transient ischemic attack)      TMJ (dislocation of temporomandibular joint)       Past Surgical History:   Procedure Laterality Date     ARTHROSCOPY KNEE       BYPASS GRAFT ARTERY CORONARY      second CABG done a few years later     CARPAL TUNNEL RELEASE RT/LT       CHOLECYSTECTOMY       DISKECTOMY, LUMBAR, SINGLE SP      Cervical--surgery x3, Lumbar x2     ESOPHAGOSCOPY       EYE SURGERY      Repair of eye after accident       HEMORRHOIDECTOMY       LASER KTP GREEN LIGHT PHOTOSELECTIVE VAPORIZATION PROSTATE N/A 2018    Procedure: LASER KTP GREEN LIGHT PHOTOSELECTIVE VAPORIZATION PROSTATE;  CYSTOSCOPY TRANSURETHRAL RESECTION OF PROSTATE ;  Surgeon: Joe Martinez MD;  Location: [facility] OR     NECK SURGERY       ORTHOPEDIC SURGERY       RELEASE TRIGGER FINGER       TURP       XR SHOULDER SURGERY DANICA RIGHT        Allergies   Allergen Reactions     Clindamycin Base Rash      Social History     Tobacco Use     Smoking " status: Former Smoker     Quit date: 1988     Years since quittin.7     Smokeless tobacco: Never Used   Substance Use Topics     Alcohol use: Not Currently      Wt Readings from Last 1 Encounters:   21 74.8 kg (165 lb)        Anesthesia Evaluation            ROS/MED HX  ENT/Pulmonary:       Neurologic: Comment: Cervical fusion        Cardiovascular:     (+) hypertension--CAD (remote history of CABG) ---    METS/Exercise Tolerance:     Hematologic:       Musculoskeletal:       GI/Hepatic:     (+) GERD, hiatal hernia,     Renal/Genitourinary:       Endo:       Psychiatric/Substance Use:       Infectious Disease:       Malignancy:       Other:            Physical Exam    Airway        Mallampati: II   TM distance: > 3 FB   Neck ROM: full     Respiratory Devices and Support         Dental           Cardiovascular   cardiovascular exam normal          Pulmonary   pulmonary exam normal                OUTSIDE LABS:  CBC:   Lab Results   Component Value Date    WBC 7.1 02/15/2017    HGB 14.0 02/15/2017    HGB 10.8 (L) 10/28/2008    HCT 39.9 (L) 02/15/2017     02/15/2017     BMP:   Lab Results   Component Value Date     02/15/2017    POTASSIUM 3.6 02/15/2017    CHLORIDE 106 02/15/2017    CO2 27 02/15/2017    BUN 18 02/15/2017    CR 1.10 02/15/2017    CR 0.80 10/28/2008     (H) 02/15/2017     COAGS: No results found for: PTT, INR, FIBR  POC: No results found for: BGM, HCG, HCGS  HEPATIC: No results found for: ALBUMIN, PROTTOTAL, ALT, AST, GGT, ALKPHOS, BILITOTAL, BILIDIRECT, ADELITA  OTHER:   Lab Results   Component Value Date    ADAMARIS 8.7 02/15/2017       Anesthesia Plan    ASA Status:  2      Anesthesia Type: MAC.              Consents    Anesthesia Plan(s) and associated risks, benefits, and realistic alternatives discussed. Questions answered and patient/representative(s) expressed understanding.     - Discussed with:  Patient         Postoperative Care    Pain management: Multi-modal  analgesia.   PONV prophylaxis: Background Propofol Infusion     Comments:                Karo Rhodes MD

## 2021-09-20 NOTE — ANESTHESIA CARE TRANSFER NOTE
Patient: Robby Mccoy    Procedure(s):  ESOPHAGOGASTRODUODENOSCOPY with biopsy    Diagnosis: Abdominal bloating [R14.0]  Diagnosis Additional Information: No value filed.    Anesthesia Type:   MAC     Note:    Oropharynx: oropharynx clear of all foreign objects  Level of Consciousness: awake  Oxygen Supplementation: room air    Independent Airway: airway patency satisfactory and stable  Dentition: dentition unchanged  Vital Signs Stable: post-procedure vital signs reviewed and stable  Report to RN Given: handoff report given  Patient transferred to: Phase II    Handoff Report: Identifed the Patient, Identified the Reponsible Provider, Reviewed the pertinent medical history, Discussed the surgical course, Reviewed Intra-OP anesthesia mangement and issues during anesthesia, Set expectations for post-procedure period and Allowed opportunity for questions and acknowledgement of understanding      Vitals:  Vitals Value Taken Time   BP     Temp     Pulse     Resp     SpO2         Electronically Signed By: RACHNA Henry CRNA  September 20, 2021  10:05 AM

## 2021-09-21 LAB
ATRIAL RATE - MUSE: 55 BPM
DIASTOLIC BLOOD PRESSURE - MUSE: NORMAL MMHG
INTERPRETATION ECG - MUSE: NORMAL
P AXIS - MUSE: -11 DEGREES
PATH REPORT.COMMENTS IMP SPEC: NORMAL
PATH REPORT.COMMENTS IMP SPEC: NORMAL
PATH REPORT.FINAL DX SPEC: NORMAL
PATH REPORT.GROSS SPEC: NORMAL
PATH REPORT.MICROSCOPIC SPEC OTHER STN: NORMAL
PATH REPORT.RELEVANT HX SPEC: NORMAL
PHOTO IMAGE: NORMAL
PR INTERVAL - MUSE: 180 MS
QRS DURATION - MUSE: 90 MS
QT - MUSE: 442 MS
QTC - MUSE: 422 MS
R AXIS - MUSE: -29 DEGREES
SYSTOLIC BLOOD PRESSURE - MUSE: NORMAL MMHG
T AXIS - MUSE: 43 DEGREES
VENTRICULAR RATE- MUSE: 55 BPM

## 2021-10-07 ENCOUNTER — DOCUMENTATION ONLY (OUTPATIENT)
Dept: OTHER | Facility: CLINIC | Age: 81
End: 2021-10-07

## 2021-10-23 ENCOUNTER — HEALTH MAINTENANCE LETTER (OUTPATIENT)
Age: 81
End: 2021-10-23

## 2022-06-04 ENCOUNTER — HEALTH MAINTENANCE LETTER (OUTPATIENT)
Age: 82
End: 2022-06-04

## 2022-10-09 ENCOUNTER — HEALTH MAINTENANCE LETTER (OUTPATIENT)
Age: 82
End: 2022-10-09

## 2023-06-10 ENCOUNTER — HEALTH MAINTENANCE LETTER (OUTPATIENT)
Age: 83
End: 2023-06-10

## 2024-08-03 ENCOUNTER — HEALTH MAINTENANCE LETTER (OUTPATIENT)
Age: 84
End: 2024-08-03

## 2025-03-10 ENCOUNTER — HOSPITAL ENCOUNTER (OUTPATIENT)
Age: 85
LOS: 120 days | Discharge: HOME | End: 2025-07-08
Payer: MEDICARE

## 2025-03-10 DIAGNOSIS — Z51.89: ICD-10-CM

## 2025-03-10 DIAGNOSIS — M51.369: Primary | ICD-10-CM

## 2025-03-10 DIAGNOSIS — M54.50: ICD-10-CM

## 2025-03-10 DIAGNOSIS — Z98.890: ICD-10-CM

## 2025-03-10 PROCEDURE — 97110 THERAPEUTIC EXERCISES: CPT

## 2025-03-10 PROCEDURE — 97140 MANUAL THERAPY 1/> REGIONS: CPT

## 2025-03-10 PROCEDURE — 97161 PT EVAL LOW COMPLEX 20 MIN: CPT

## 2025-06-22 ENCOUNTER — APPOINTMENT (OUTPATIENT)
Dept: GENERAL RADIOLOGY | Facility: CLINIC | Age: 85
End: 2025-06-22
Attending: EMERGENCY MEDICINE
Payer: COMMERCIAL

## 2025-06-22 ENCOUNTER — APPOINTMENT (OUTPATIENT)
Dept: ULTRASOUND IMAGING | Facility: CLINIC | Age: 85
End: 2025-06-22
Attending: EMERGENCY MEDICINE
Payer: COMMERCIAL

## 2025-06-22 ENCOUNTER — HOSPITAL ENCOUNTER (OUTPATIENT)
Facility: CLINIC | Age: 85
Setting detail: OBSERVATION
Discharge: HOME | End: 2025-06-24
Attending: EMERGENCY MEDICINE | Admitting: INTERNAL MEDICINE
Payer: COMMERCIAL

## 2025-06-22 DIAGNOSIS — R79.89 ELEVATED TROPONIN: ICD-10-CM

## 2025-06-22 DIAGNOSIS — R07.9 CHEST PAIN, UNSPECIFIED TYPE: ICD-10-CM

## 2025-06-22 LAB
ANION GAP SERPL CALCULATED.3IONS-SCNC: 12 MMOL/L (ref 7–15)
BASOPHILS # BLD AUTO: 0 10E3/UL (ref 0–0.2)
BASOPHILS NFR BLD AUTO: 1 %
BUN SERPL-MCNC: 21 MG/DL (ref 8–23)
CALCIUM SERPL-MCNC: 10 MG/DL (ref 8.8–10.4)
CHLORIDE SERPL-SCNC: 102 MMOL/L (ref 98–107)
CREAT SERPL-MCNC: 1.46 MG/DL (ref 0.67–1.17)
EGFRCR SERPLBLD CKD-EPI 2021: 47 ML/MIN/1.73M2
EOSINOPHIL # BLD AUTO: 0.2 10E3/UL (ref 0–0.7)
EOSINOPHIL NFR BLD AUTO: 3 %
ERYTHROCYTE [DISTWIDTH] IN BLOOD BY AUTOMATED COUNT: 12.6 % (ref 10–15)
ERYTHROCYTE [DISTWIDTH] IN BLOOD BY AUTOMATED COUNT: 12.7 % (ref 10–15)
GLUCOSE BLDC GLUCOMTR-MCNC: 84 MG/DL (ref 70–99)
GLUCOSE SERPL-MCNC: 96 MG/DL (ref 70–99)
HCO3 SERPL-SCNC: 26 MMOL/L (ref 22–29)
HCT VFR BLD AUTO: 36.8 % (ref 40–53)
HCT VFR BLD AUTO: 36.9 % (ref 40–53)
HGB BLD-MCNC: 12.2 G/DL (ref 13.3–17.7)
HGB BLD-MCNC: 12.4 G/DL (ref 13.3–17.7)
HOLD SPECIMEN: NORMAL
IMM GRANULOCYTES # BLD: 0 10E3/UL
IMM GRANULOCYTES NFR BLD: 0 %
LYMPHOCYTES # BLD AUTO: 1.8 10E3/UL (ref 0.8–5.3)
LYMPHOCYTES NFR BLD AUTO: 31 %
MCH RBC QN AUTO: 28.8 PG (ref 26.5–33)
MCH RBC QN AUTO: 29.2 PG (ref 26.5–33)
MCHC RBC AUTO-ENTMCNC: 33.2 G/DL (ref 31.5–36.5)
MCHC RBC AUTO-ENTMCNC: 33.6 G/DL (ref 31.5–36.5)
MCV RBC AUTO: 87 FL (ref 78–100)
MCV RBC AUTO: 87 FL (ref 78–100)
MONOCYTES # BLD AUTO: 0.5 10E3/UL (ref 0–1.3)
MONOCYTES NFR BLD AUTO: 9 %
NEUTROPHILS # BLD AUTO: 3.2 10E3/UL (ref 1.6–8.3)
NEUTROPHILS NFR BLD AUTO: 56 %
NRBC # BLD AUTO: 0 10E3/UL
NRBC BLD AUTO-RTO: 0 /100
NT-PROBNP SERPL-MCNC: 3235 PG/ML (ref 0–852)
PLATELET # BLD AUTO: 151 10E3/UL (ref 150–450)
PLATELET # BLD AUTO: 152 10E3/UL (ref 150–450)
POTASSIUM SERPL-SCNC: 4.3 MMOL/L (ref 3.4–5.3)
RBC # BLD AUTO: 4.23 10E6/UL (ref 4.4–5.9)
RBC # BLD AUTO: 4.25 10E6/UL (ref 4.4–5.9)
SODIUM SERPL-SCNC: 140 MMOL/L (ref 135–145)
TROPONIN T SERPL HS-MCNC: 66 NG/L
TROPONIN T SERPL HS-MCNC: 67 NG/L
WBC # BLD AUTO: 5.8 10E3/UL (ref 4–11)
WBC # BLD AUTO: 5.9 10E3/UL (ref 4–11)

## 2025-06-22 PROCEDURE — 96366 THER/PROPH/DIAG IV INF ADDON: CPT

## 2025-06-22 PROCEDURE — 93005 ELECTROCARDIOGRAM TRACING: CPT

## 2025-06-22 PROCEDURE — 250N000011 HC RX IP 250 OP 636: Performed by: HOSPITALIST

## 2025-06-22 PROCEDURE — 83880 ASSAY OF NATRIURETIC PEPTIDE: CPT | Performed by: EMERGENCY MEDICINE

## 2025-06-22 PROCEDURE — 85014 HEMATOCRIT: CPT | Performed by: EMERGENCY MEDICINE

## 2025-06-22 PROCEDURE — 99285 EMERGENCY DEPT VISIT HI MDM: CPT | Mod: 25

## 2025-06-22 PROCEDURE — 120N000013 HC R&B IMCU

## 2025-06-22 PROCEDURE — 71046 X-RAY EXAM CHEST 2 VIEWS: CPT

## 2025-06-22 PROCEDURE — 84484 ASSAY OF TROPONIN QUANT: CPT | Performed by: EMERGENCY MEDICINE

## 2025-06-22 PROCEDURE — 93005 ELECTROCARDIOGRAM TRACING: CPT | Mod: 76

## 2025-06-22 PROCEDURE — 99223 1ST HOSP IP/OBS HIGH 75: CPT | Performed by: HOSPITALIST

## 2025-06-22 PROCEDURE — 85025 COMPLETE CBC W/AUTO DIFF WBC: CPT | Performed by: EMERGENCY MEDICINE

## 2025-06-22 PROCEDURE — 250N000013 HC RX MED GY IP 250 OP 250 PS 637: Performed by: EMERGENCY MEDICINE

## 2025-06-22 PROCEDURE — 93971 EXTREMITY STUDY: CPT | Mod: RT

## 2025-06-22 PROCEDURE — 36415 COLL VENOUS BLD VENIPUNCTURE: CPT | Performed by: EMERGENCY MEDICINE

## 2025-06-22 PROCEDURE — 250N000011 HC RX IP 250 OP 636: Performed by: EMERGENCY MEDICINE

## 2025-06-22 PROCEDURE — 80048 BASIC METABOLIC PNL TOTAL CA: CPT | Performed by: EMERGENCY MEDICINE

## 2025-06-22 PROCEDURE — 96365 THER/PROPH/DIAG IV INF INIT: CPT

## 2025-06-22 RX ORDER — ONDANSETRON 2 MG/ML
4 INJECTION INTRAMUSCULAR; INTRAVENOUS EVERY 6 HOURS PRN
Status: DISCONTINUED | OUTPATIENT
Start: 2025-06-22 | End: 2025-06-24 | Stop reason: HOSPADM

## 2025-06-22 RX ORDER — LOSARTAN POTASSIUM 50 MG/1
50 TABLET ORAL DAILY
Status: DISCONTINUED | OUTPATIENT
Start: 2025-06-22 | End: 2025-06-23

## 2025-06-22 RX ORDER — AMOXICILLIN 250 MG
1 CAPSULE ORAL 2 TIMES DAILY PRN
Status: DISCONTINUED | OUTPATIENT
Start: 2025-06-22 | End: 2025-06-24 | Stop reason: HOSPADM

## 2025-06-22 RX ORDER — ROSUVASTATIN CALCIUM 20 MG/1
20 TABLET, COATED ORAL EVERY EVENING
Status: DISCONTINUED | OUTPATIENT
Start: 2025-06-22 | End: 2025-06-24 | Stop reason: HOSPADM

## 2025-06-22 RX ORDER — AMOXICILLIN 250 MG
2 CAPSULE ORAL 2 TIMES DAILY PRN
Status: DISCONTINUED | OUTPATIENT
Start: 2025-06-22 | End: 2025-06-24 | Stop reason: HOSPADM

## 2025-06-22 RX ORDER — CALCIUM CARBONATE 500 MG/1
1000 TABLET, CHEWABLE ORAL 4 TIMES DAILY PRN
Status: DISCONTINUED | OUTPATIENT
Start: 2025-06-22 | End: 2025-06-24 | Stop reason: HOSPADM

## 2025-06-22 RX ORDER — HEPARIN SODIUM 10000 [USP'U]/100ML
0-5000 INJECTION, SOLUTION INTRAVENOUS CONTINUOUS
Status: DISCONTINUED | OUTPATIENT
Start: 2025-06-22 | End: 2025-06-22

## 2025-06-22 RX ORDER — METOPROLOL SUCCINATE 25 MG/1
25 TABLET, EXTENDED RELEASE ORAL DAILY
Status: DISCONTINUED | OUTPATIENT
Start: 2025-06-22 | End: 2025-06-24 | Stop reason: HOSPADM

## 2025-06-22 RX ORDER — METOPROLOL SUCCINATE 25 MG/1
25 TABLET, EXTENDED RELEASE ORAL EVERY EVENING
COMMUNITY

## 2025-06-22 RX ORDER — ERGOCALCIFEROL 1.25 MG/1
50000 CAPSULE, LIQUID FILLED ORAL WEEKLY
COMMUNITY

## 2025-06-22 RX ORDER — FUROSEMIDE 20 MG/1
20 TABLET ORAL DAILY
COMMUNITY

## 2025-06-22 RX ORDER — LIDOCAINE 40 MG/G
CREAM TOPICAL
Status: DISCONTINUED | OUTPATIENT
Start: 2025-06-22 | End: 2025-06-24 | Stop reason: HOSPADM

## 2025-06-22 RX ORDER — NITROGLYCERIN 0.6 MG/1
0.6 TABLET SUBLINGUAL EVERY 5 MIN PRN
COMMUNITY

## 2025-06-22 RX ORDER — NITROGLYCERIN 0.4 MG/1
0.4 TABLET SUBLINGUAL EVERY 5 MIN PRN
Status: DISCONTINUED | OUTPATIENT
Start: 2025-06-22 | End: 2025-06-24 | Stop reason: HOSPADM

## 2025-06-22 RX ORDER — PANTOPRAZOLE SODIUM 40 MG/1
40 TABLET, DELAYED RELEASE ORAL EVERY MORNING
Status: DISCONTINUED | OUTPATIENT
Start: 2025-06-23 | End: 2025-06-24 | Stop reason: HOSPADM

## 2025-06-22 RX ORDER — ASPIRIN 325 MG
325 TABLET ORAL ONCE
Status: COMPLETED | OUTPATIENT
Start: 2025-06-22 | End: 2025-06-22

## 2025-06-22 RX ORDER — HYDRALAZINE HYDROCHLORIDE 20 MG/ML
10 INJECTION INTRAMUSCULAR; INTRAVENOUS EVERY 4 HOURS PRN
Status: DISCONTINUED | OUTPATIENT
Start: 2025-06-22 | End: 2025-06-24 | Stop reason: HOSPADM

## 2025-06-22 RX ORDER — ISOSORBIDE MONONITRATE 30 MG/1
30 TABLET, EXTENDED RELEASE ORAL DAILY
Status: DISCONTINUED | OUTPATIENT
Start: 2025-06-22 | End: 2025-06-24 | Stop reason: HOSPADM

## 2025-06-22 RX ORDER — ISOSORBIDE MONONITRATE 30 MG/1
30 TABLET, EXTENDED RELEASE ORAL DAILY
COMMUNITY

## 2025-06-22 RX ORDER — HYDRALAZINE HYDROCHLORIDE 10 MG/1
10 TABLET, FILM COATED ORAL EVERY 4 HOURS PRN
Status: DISCONTINUED | OUTPATIENT
Start: 2025-06-22 | End: 2025-06-24 | Stop reason: HOSPADM

## 2025-06-22 RX ORDER — HEPARIN SODIUM 10000 [USP'U]/100ML
0-5000 INJECTION, SOLUTION INTRAVENOUS CONTINUOUS
Status: DISCONTINUED | OUTPATIENT
Start: 2025-06-22 | End: 2025-06-24 | Stop reason: HOSPADM

## 2025-06-22 RX ORDER — ONDANSETRON 4 MG/1
4 TABLET, ORALLY DISINTEGRATING ORAL EVERY 6 HOURS PRN
Status: DISCONTINUED | OUTPATIENT
Start: 2025-06-22 | End: 2025-06-24 | Stop reason: HOSPADM

## 2025-06-22 RX ORDER — ASPIRIN 81 MG/1
81 TABLET ORAL DAILY
Status: DISCONTINUED | OUTPATIENT
Start: 2025-06-23 | End: 2025-06-23

## 2025-06-22 RX ORDER — DOXAZOSIN 1 MG/1
2 TABLET ORAL AT BEDTIME
Status: DISCONTINUED | OUTPATIENT
Start: 2025-06-22 | End: 2025-06-23

## 2025-06-22 RX ORDER — LOSARTAN POTASSIUM 25 MG/1
25 TABLET ORAL EVERY OTHER DAY
COMMUNITY

## 2025-06-22 RX ADMIN — HEPARIN SODIUM 900 UNITS/HR: 10000 INJECTION, SOLUTION INTRAVENOUS at 19:20

## 2025-06-22 RX ADMIN — ASPIRIN 325 MG ORAL TABLET 325 MG: 325 PILL ORAL at 17:34

## 2025-06-22 ASSESSMENT — ACTIVITIES OF DAILY LIVING (ADL)
ADLS_ACUITY_SCORE: 25
ADLS_ACUITY_SCORE: 41
ADLS_ACUITY_SCORE: 19
ADLS_ACUITY_SCORE: 41
ADLS_ACUITY_SCORE: 41
ADLS_ACUITY_SCORE: 19
ADLS_ACUITY_SCORE: 41

## 2025-06-22 ASSESSMENT — COLUMBIA-SUICIDE SEVERITY RATING SCALE - C-SSRS
1. IN THE PAST MONTH, HAVE YOU WISHED YOU WERE DEAD OR WISHED YOU COULD GO TO SLEEP AND NOT WAKE UP?: NO
2. HAVE YOU ACTUALLY HAD ANY THOUGHTS OF KILLING YOURSELF IN THE PAST MONTH?: NO
6. HAVE YOU EVER DONE ANYTHING, STARTED TO DO ANYTHING, OR PREPARED TO DO ANYTHING TO END YOUR LIFE?: NO

## 2025-06-22 NOTE — ED TRIAGE NOTES
Right sided chest pain since this morning. Took 3 nitroglycerin, last one taken at 1330. Denies dyspnea or SOB.

## 2025-06-22 NOTE — ED NOTES
"[facility]  ED Nurse Handoff Report    ED Chief complaint: Chest Pain  . ED Diagnosis:   Final diagnoses:   None       Allergies:   Allergies   Allergen Reactions    Clindamycin Base Rash       Code Status: Full Code    Activity level - Baseline/Home:  independent.  Activity Level - Current:   independent.   Lift room needed: No.   Bariatric: No   Needed: No   Isolation: No.   Infection: Not Applicable.     Respiratory status: Room air    Vital Signs (within 30 minutes):   Vitals:    06/22/25 1619 06/22/25 1630 06/22/25 1646 06/22/25 1745   BP: (!) 171/92 (!) 165/81  (!) 168/81   Pulse: 62 63 62 54   Resp: 16 13 11    Temp: 97.5  F (36.4  C)      TempSrc: Temporal      SpO2: 99% 98% 97% 99%   Weight: 75.9 kg (167 lb 5.3 oz)      Height: 1.753 m (5' 9\")          Cardiac Rhythm:  ,      Pain level:    Patient confused: No.   Patient Falls Risk: bed/chair alarm on, nonskid shoes/slippers when out of bed, arm band in place, and patient and family education.   Elimination Status: Has voided     Patient Report - Initial Complaint: chest pain.   Focused Assessment: Right sided chest pain since this morning. Took 3 nitroglycerin, last one taken at 1330. Denies dyspnea or SOB.      Abnormal Results:   Labs Ordered and Resulted from Time of ED Arrival to Time of ED Departure   BASIC METABOLIC PANEL - Abnormal       Result Value    Sodium 140      Potassium 4.3      Chloride 102      Carbon Dioxide (CO2) 26      Anion Gap 12      Urea Nitrogen 21.0      Creatinine 1.46 (*)     GFR Estimate 47 (*)     Calcium 10.0      Glucose 96     TROPONIN T, HIGH SENSITIVITY - Abnormal    Troponin T, High Sensitivity 67 (*)    NT-PROBNP - Abnormal    NT-proBNP 3,235 (*)    CBC WITH PLATELETS AND DIFFERENTIAL - Abnormal    WBC Count 5.8      RBC Count 4.23 (*)     Hemoglobin 12.2 (*)     Hematocrit 36.8 (*)     MCV 87      MCH 28.8      MCHC 33.2      RDW 12.6      Platelet Count 151      % Neutrophils 56   "    % Lymphocytes 31      % Monocytes 9      % Eosinophils 3      % Basophils 1      % Immature Granulocytes 0      NRBCs per 100 WBC 0      Absolute Neutrophils 3.2      Absolute Lymphocytes 1.8      Absolute Monocytes 0.5      Absolute Eosinophils 0.2      Absolute Basophils 0.0      Absolute Immature Granulocytes 0.0      Absolute NRBCs 0.0     TROPONIN T, HIGH SENSITIVITY        XR Chest 2 Views   Final Result   IMPRESSION: Again seen are sternotomy wires with mediastinal clips and vascular markers. The cardiac silhouette is stable in size, near the upper limit of normal. Pulmonary vascularity is within normal limits. The lungs are clear. No evidence of pleural    effusion. Postoperative changes lower cervical spine. Mild degenerative changes thoracic spine. No acute changes identified.      US Lower Extremity Venous Duplex Right   Final Result   IMPRESSION:   1.  No deep venous thrombosis in the right lower extremity.          Treatments provided: aspirin, nitroglycerine  Family Comments: at bedside, supportive  OBS brochure/video discussed/provided to patient:  N/A  ED Medications:   Medications   aspirin (ASA) tablet 325 mg (325 mg Oral $Given 6/22/25 8097)       Drips infusing:  No  For the majority of the shift this patient was Green.   Interventions performed were labs, imaging, see MAR.    Sepsis treatment initiated: No    Cares/treatment/interventions/medications to be completed following ED care: see POC    ED Nurse Name: Aidan Wellington RN  5:53 PM

## 2025-06-22 NOTE — ED PROVIDER NOTES
History     Chief Complaint:  Chest Pain       HPI   Robby Mccoy is a 84 year old male history of coronary artery disease, atrial fibrillation on eliquis presenting to the emergency department for right sided chest pain.  He states that approximately 11:00 this morning he developed sudden onset of intermittent right sided chest pain.  He states that the symptoms come on at random and last for approximately 15 seconds and then resolve on their own.  He denies any exacerbating or alleviating factors.  No associated shortness of breath, nausea or diaphoresis.  He notes that he has some chronic pain in his right shoulder but denies any radiation of his pain from the chest to his neck, back or arm.  He did take several nitro tabs and since then states he has not had any pain.  He states that this does not feel similar to his previous episodes of coronary artery disease.  He also notes that he has had some right sided leg swelling over the past month.  It is atraumatic in nature.      Independent Historian:    None    Review of External Notes:  Did review cardiology note from 3/27/2025 to understand the patient's underlying cardiac history including CABGx2  . Also documented history of previous DVT    Medications:    Acetaminophen (TYLENOL PO)  Apoaequorin (PREVAGEN PO)  aspirin (ASA) 81 MG EC tablet  Cyanocobalamin 5000 MCG SUBL  desonide (DESOWEN) 0.05 % external ointment  doxazosin (CARDURA) 2 MG tablet  Flaxseed, Linseed, (FLAXSEED OIL PO)  losartan (COZAAR) 50 MG tablet  Melatonin 10 MG CAPS  OMEPRAZOLE PO  rosuvastatin (CRESTOR) 20 MG tablet        Past Medical History:    Past Medical History:   Diagnosis Date    Allergic rhinitis     ASHD (arteriosclerotic heart disease)     Carotid stenosis, bilateral     Cataracts, bilateral     Cervical spondylosis     Displacement of lumbar intervertebral disc without myelopathy     Diverticulum of esophagus, acquired     Gastroesophageal reflux disease      "Hematuria     Hiatal hernia     History of blood transfusion     Hyperbilirubinemia     Hyperlipidemia     Hypertension     Hypertrophy of prostate with urinary obstruction     Other chest pain     Primary pulmonary coccidioidomycosis     Proteinuria     Sensorineural hearing loss, asymmetrical     Subjective tinnitus     Tendonitis     TIA (transient ischemic attack)     TMJ (dislocation of temporomandibular joint)        Past Surgical History:    Past Surgical History:   Procedure Laterality Date    ARTHROSCOPY KNEE      BYPASS GRAFT ARTERY CORONARY  2003    second CABG done a few years later    CARPAL TUNNEL RELEASE RT/LT      CHOLECYSTECTOMY      DISKECTOMY, LUMBAR, SINGLE SP      Cervical--surgery x3, Lumbar x2    ESOPHAGOSCOPY      ESOPHAGOSCOPY, GASTROSCOPY, DUODENOSCOPY (EGD), COMBINED N/A 9/20/2021    Procedure: ESOPHAGOGASTRODUODENOSCOPY with biopsy;  Surgeon: Lionel Bazzi MD;  Location: [facility] OR    EYE SURGERY      Repair of eye after accident      HEMORRHOIDECTOMY      LASER KTP GREEN LIGHT PHOTOSELECTIVE VAPORIZATION PROSTATE N/A 7/20/2018    Procedure: LASER KTP GREEN LIGHT PHOTOSELECTIVE VAPORIZATION PROSTATE;  CYSTOSCOPY TRANSURETHRAL RESECTION OF PROSTATE ;  Surgeon: Joe Martinez MD;  Location: [facility] OR    NECK SURGERY      ORTHOPEDIC SURGERY      RELEASE TRIGGER FINGER      TURP      XR SHOULDER SURGERY DANICA RIGHT            Physical Exam   Patient Vitals for the past 24 hrs:   BP Temp Temp src Pulse Resp SpO2 Height Weight   06/22/25 1745 (!) 168/81 -- -- 54 -- 99 % -- --   06/22/25 1646 -- -- -- 62 11 97 % -- --   06/22/25 1630 (!) 165/81 -- -- 63 13 98 % -- --   06/22/25 1619 (!) 171/92 97.5  F (36.4  C) Temporal 62 16 99 % 1.753 m (5' 9\") 75.9 kg (167 lb 5.3 oz)        Physical Exam  Nursing note and vitals reviewed.  HENT:   Mouth/Throat: Oropharynx is clear and moist.   Eyes: Conjunctivae and EOM are normal. Pupils are equal, round, and reactive to light.   Cardiovascular: Normal rate " but irregular rhythm and normal heart sounds.    Pulmonary/Chest: Effort normal and breath sounds normal.   Abdominal: Soft. Bowel sounds are normal.   Musculoskeletal: T+ edema noted to the right lower leg.   Lymphadenopathy:    The patient has no cervical adenopathy.   Neurological: The patient is alert.        No facial droop or focal extremity weakness.   Skin: Skin is warm and dry. No rash noted.   Psychiatric: The patient has a normal mood and affect. The patient's behavior is normal.      Emergency Department Course   ECG  ECG taken at 1620, ECG read at 1625       Rate 71 bpm. QRS duration 96 ms. QT/QTc 420/456 ms. P-R-T axes  -46 28.    Imaging:  XR Chest 2 Views   Final Result   IMPRESSION: Again seen are sternotomy wires with mediastinal clips and vascular markers. The cardiac silhouette is stable in size, near the upper limit of normal. Pulmonary vascularity is within normal limits. The lungs are clear. No evidence of pleural    effusion. Postoperative changes lower cervical spine. Mild degenerative changes thoracic spine. No acute changes identified.      US Lower Extremity Venous Duplex Right   Final Result   IMPRESSION:   1.  No deep venous thrombosis in the right lower extremity.          Laboratory:  Labs Ordered and Resulted from Time of ED Arrival to Time of ED Departure   BASIC METABOLIC PANEL - Abnormal       Result Value    Sodium 140      Potassium 4.3      Chloride 102      Carbon Dioxide (CO2) 26      Anion Gap 12      Urea Nitrogen 21.0      Creatinine 1.46 (*)     GFR Estimate 47 (*)     Calcium 10.0      Glucose 96     TROPONIN T, HIGH SENSITIVITY - Abnormal    Troponin T, High Sensitivity 67 (*)    NT-PROBNP - Abnormal    NT-proBNP 3,235 (*)    CBC WITH PLATELETS AND DIFFERENTIAL - Abnormal    WBC Count 5.8      RBC Count 4.23 (*)     Hemoglobin 12.2 (*)     Hematocrit 36.8 (*)     MCV 87      MCH 28.8      MCHC 33.2      RDW 12.6      Platelet Count 151      % Neutrophils 56      %  Lymphocytes 31      % Monocytes 9      % Eosinophils 3      % Basophils 1      % Immature Granulocytes 0      NRBCs per 100 WBC 0      Absolute Neutrophils 3.2      Absolute Lymphocytes 1.8      Absolute Monocytes 0.5      Absolute Eosinophils 0.2      Absolute Basophils 0.0      Absolute Immature Granulocytes 0.0      Absolute NRBCs 0.0     CBC WITH PLATELETS - Abnormal    WBC Count 5.9      RBC Count 4.25 (*)     Hemoglobin 12.4 (*)     Hematocrit 36.9 (*)     MCV 87      MCH 29.2      MCHC 33.6      RDW 12.7      Platelet Count 152     TROPONIN T, HIGH SENSITIVITY        Procedures   None    Emergency Department Course & Assessments:    Interventions:  Medications   heparin 25,000 units in 0.45% NaCl 250 mL ANTICOAGULANT infusion (900 Units/hr Intravenous $New Bag 6/22/25 1920)   losartan (COZAAR) tablet 50 mg (has no administration in time range)   doxazosin (CARDURA) tablet 2 mg (has no administration in time range)   rosuvastatin (CRESTOR) tablet 20 mg (has no administration in time range)   metoprolol succinate ER (TOPROL XL) 24 hr tablet 25 mg (has no administration in time range)   isosorbide mononitrate (IMDUR) 24 hr tablet 30 mg (has no administration in time range)   nitroGLYcerin (NITROSTAT) sublingual tablet 0.4 mg (has no administration in time range)   aspirin (ASA) tablet 325 mg (325 mg Oral $Given 6/22/25 5718)          Independent Interpretation (X-rays, CTs, rhythm strip):  CXR shows no focal infiltrate. No pneumothorax or widened mediastinum.     Consultations/Discussion of Management or Tests:  Did consult the hospitalist for admission       Social Drivers of Health affecting care:  None     Disposition:  The patient was admitted to the hospital under the care of Dr. Zavaleta.    Impression & Plan    CMS Diagnoses: None       Medical Decision Making:  Patient is an 84-year-old male with a history of coronary artery disease presenting to the emergency department for chest pain.  On evaluation  here, broad differential was considered including ACS, PE, pneumonia, pneumothorax, aortic dissection.  Here his EKG shows no obvious signs of ischemia at this time though his troponin came back elevated from his baseline.  In review of his chart, he typically runs in the teens but today he is in the high 60s.  Given this, there was concern that there could be a component of acute coronary syndrome.  Because of this, he was given aspirin and started on heparin.  Chest x-ray shows no focal infiltrate to suggest pneumonia.  There is no pneumothorax or widened mediastinum to suggest dissection.  He was having some right lower extremity swelling but DVT study shows no evidence of this as the cause.  Given his evaluation here, the patient be admitted to the hospital for further evaluation and management.  I discussed the case with Dr. Zavaleta who accepted the patient    Diagnosis:    ICD-10-CM    1. Chest pain, unspecified type  R07.9       2. Elevated troponin  R79.89            Discharge Medications:  New Prescriptions    No medications on file          Trent Hussein DO  6/22/2025   Trent Hussein, Trent Ortiz,   06/22/25 1930

## 2025-06-23 LAB
ANION GAP SERPL CALCULATED.3IONS-SCNC: 10 MMOL/L (ref 7–15)
APTT PPP: 137 SECONDS (ref 22–38)
APTT PPP: 64 SECONDS (ref 22–38)
APTT PPP: 71 SECONDS (ref 22–38)
ATRIAL RATE - MUSE: NORMAL BPM
BUN SERPL-MCNC: 25.3 MG/DL (ref 8–23)
CALCIUM SERPL-MCNC: 9.7 MG/DL (ref 8.8–10.4)
CHLORIDE SERPL-SCNC: 106 MMOL/L (ref 98–107)
CREAT SERPL-MCNC: 1.43 MG/DL (ref 0.67–1.17)
DIASTOLIC BLOOD PRESSURE - MUSE: NORMAL MMHG
EGFRCR SERPLBLD CKD-EPI 2021: 48 ML/MIN/1.73M2
GLUCOSE SERPL-MCNC: 84 MG/DL (ref 70–99)
HCO3 SERPL-SCNC: 25 MMOL/L (ref 22–29)
INTERPRETATION ECG - MUSE: NORMAL
LVEF ECHO: NORMAL
P AXIS - MUSE: NORMAL DEGREES
POTASSIUM SERPL-SCNC: 4.1 MMOL/L (ref 3.4–5.3)
PR INTERVAL - MUSE: NORMAL MS
QRS DURATION - MUSE: 96 MS
QRS DURATION - MUSE: 96 MS
QRS DURATION - MUSE: 98 MS
QT - MUSE: 416 MS
QT - MUSE: 420 MS
QT - MUSE: 458 MS
QTC - MUSE: 416 MS
QTC - MUSE: 434 MS
QTC - MUSE: 456 MS
R AXIS - MUSE: -45 DEGREES
R AXIS - MUSE: -46 DEGREES
R AXIS - MUSE: -52 DEGREES
SODIUM SERPL-SCNC: 141 MMOL/L (ref 135–145)
SYSTOLIC BLOOD PRESSURE - MUSE: NORMAL MMHG
T AXIS - MUSE: 17 DEGREES
T AXIS - MUSE: 28 DEGREES
T AXIS - MUSE: 59 DEGREES
VENTRICULAR RATE- MUSE: 54 BPM
VENTRICULAR RATE- MUSE: 60 BPM
VENTRICULAR RATE- MUSE: 71 BPM

## 2025-06-23 PROCEDURE — 250N000013 HC RX MED GY IP 250 OP 250 PS 637: Performed by: INTERNAL MEDICINE

## 2025-06-23 PROCEDURE — 85730 THROMBOPLASTIN TIME PARTIAL: CPT | Performed by: INTERNAL MEDICINE

## 2025-06-23 PROCEDURE — 250N000011 HC RX IP 250 OP 636: Performed by: HOSPITALIST

## 2025-06-23 PROCEDURE — 250N000013 HC RX MED GY IP 250 OP 250 PS 637: Performed by: HOSPITALIST

## 2025-06-23 PROCEDURE — G0378 HOSPITAL OBSERVATION PER HR: HCPCS

## 2025-06-23 PROCEDURE — 85730 THROMBOPLASTIN TIME PARTIAL: CPT | Performed by: EMERGENCY MEDICINE

## 2025-06-23 PROCEDURE — 36415 COLL VENOUS BLD VENIPUNCTURE: CPT | Performed by: EMERGENCY MEDICINE

## 2025-06-23 PROCEDURE — 36415 COLL VENOUS BLD VENIPUNCTURE: CPT | Performed by: HOSPITALIST

## 2025-06-23 PROCEDURE — 99239 HOSP IP/OBS DSCHRG MGMT >30: CPT | Performed by: INTERNAL MEDICINE

## 2025-06-23 PROCEDURE — 36415 COLL VENOUS BLD VENIPUNCTURE: CPT | Performed by: INTERNAL MEDICINE

## 2025-06-23 PROCEDURE — 99222 1ST HOSP IP/OBS MODERATE 55: CPT | Mod: 25 | Performed by: INTERNAL MEDICINE

## 2025-06-23 PROCEDURE — 96366 THER/PROPH/DIAG IV INF ADDON: CPT

## 2025-06-23 PROCEDURE — 80048 BASIC METABOLIC PNL TOTAL CA: CPT | Performed by: HOSPITALIST

## 2025-06-23 RX ORDER — DOXAZOSIN 1 MG/1
2 TABLET ORAL DAILY
Status: DISCONTINUED | OUTPATIENT
Start: 2025-06-23 | End: 2025-06-24 | Stop reason: HOSPADM

## 2025-06-23 RX ORDER — LOSARTAN POTASSIUM 25 MG/1
25 TABLET ORAL EVERY OTHER DAY
Status: DISCONTINUED | OUTPATIENT
Start: 2025-06-23 | End: 2025-06-24 | Stop reason: HOSPADM

## 2025-06-23 RX ORDER — LOSARTAN POTASSIUM 50 MG/1
50 TABLET ORAL EVERY OTHER DAY
Status: DISCONTINUED | OUTPATIENT
Start: 2025-06-24 | End: 2025-06-24 | Stop reason: HOSPADM

## 2025-06-23 RX ADMIN — PANTOPRAZOLE SODIUM 40 MG: 40 TABLET, DELAYED RELEASE ORAL at 08:12

## 2025-06-23 RX ADMIN — ROSUVASTATIN 20 MG: 20 TABLET, FILM COATED ORAL at 19:56

## 2025-06-23 RX ADMIN — METOPROLOL SUCCINATE 25 MG: 25 TABLET, EXTENDED RELEASE ORAL at 08:12

## 2025-06-23 RX ADMIN — ISOSORBIDE MONONITRATE 30 MG: 30 TABLET, EXTENDED RELEASE ORAL at 08:12

## 2025-06-23 RX ADMIN — LOSARTAN POTASSIUM 25 MG: 25 TABLET, FILM COATED ORAL at 12:06

## 2025-06-23 RX ADMIN — CALCIUM CARBONATE (ANTACID) CHEW TAB 500 MG 1000 MG: 500 CHEW TAB at 18:53

## 2025-06-23 RX ADMIN — DOXAZOSIN 2 MG: 1 TABLET ORAL at 09:55

## 2025-06-23 ASSESSMENT — ACTIVITIES OF DAILY LIVING (ADL)
ADLS_ACUITY_SCORE: 30
ADLS_ACUITY_SCORE: 25
ADLS_ACUITY_SCORE: 36
ADLS_ACUITY_SCORE: 30
ADLS_ACUITY_SCORE: 36
ADLS_ACUITY_SCORE: 30
ADLS_ACUITY_SCORE: 25
ADLS_ACUITY_SCORE: 36
ADLS_ACUITY_SCORE: 36
ADLS_ACUITY_SCORE: 30
ADLS_ACUITY_SCORE: 30
ADLS_ACUITY_SCORE: 25
ADLS_ACUITY_SCORE: 36
ADLS_ACUITY_SCORE: 25
ADLS_ACUITY_SCORE: 36
ADLS_ACUITY_SCORE: 30
ADLS_ACUITY_SCORE: 30
ADLS_ACUITY_SCORE: 25
ADLS_ACUITY_SCORE: 30
ADLS_ACUITY_SCORE: 30
ADLS_ACUITY_SCORE: 36

## 2025-06-23 NOTE — DISCHARGE SUMMARY
[facility]  Hospitalist Discharge Summary      Date of Admission:  6/22/2025  Date of Discharge:  6/23/2025  Discharging Provider: Zeke Ramey MD, MD  Discharge Service: Hospitalist Service    Discharge Diagnoses   Atypical chest pain  Ruled out for ACS   suspected chronic troponin elevation  Chronic coronary artery disease  History of permanent atrial fibrillation on chronic anticoagulation  History of CKD  History of prior VTE on anticoagulation  Drug-induced coagulopathy  History of prior TIA    Clinically Significant Risk Factors          Follow-ups Needed After Discharge   Follow-up Appointments       Follow Up      Follow up with cardiology as scheduled        Hospital Follow-up with Existing Primary Care Provider (PCP)          Schedule Primary Care visit within: 14 Days               Unresulted Labs Ordered in the Past 30 Days of this Admission       No orders found for last 31 day(s).            Discharge Disposition   Discharged to home  Condition at discharge: Stable    Hospital Course      Robby Mccoy is a 84 year old male admitted on 6/22/2025 with PMH of coronary artery disease status CABG in 2003 with LIMA to LAD, SVG to RCA, SVG to OM.  Redo bypass surgery in 2008 with vein graft to the LAD as LIMA graft was occluded, DVT, hypertension, dyslipidemia, chronic kidney disease, TIA who presents after 15 to 20 minutes of right-sided chest pain that began this morning while at rest and resolved with NTG.  Found to have troponin elevation and was subsequently hospitalized and was monitored closely in the cardiac telemetry floor.  Currently being seen by cardiology service with intention in pursuing further risk stratification with pending Lexiscan testing today.  Fortunately he is exhibiting no worsening cardiorespiratory symptoms.  Continues to demonstrate stable hemodynamics.  No reported alteration in mental state.  Currently he is not hypoxic.  He is exhibiting no  bleeding symptoms or tendencies with underlying use of heparinization.  Hopeful for hospital discharge if no further plans or issues seen during this hospitalization.  Currently he is on good set of cardiac meds with continuation of his ARB, beta-blockers, long-term anticoagulation, DOAC, nitrates and and statins.  Awaiting for results of earlier Lexiscan      Robby Mccoy is a 84 year old male admitted on 6/22/2025 with PMH of coronary artery disease status CABG in 2003 with LIMA to LAD, SVG to RCA, SVG to OM.  Redo bypass surgery in 2008 with vein graft to the LAD as LIMA graft was occluded, DVT, hypertension, dyslipidemia, chronic kidney disease, TIA who presents after 15 to 20 minutes of right-sided chest pain that began this morning while at rest and resolved with NTG.  Found to have troponin elevation which is new.    Trop elevation  Right sided chest pain relieved with NTG  Concern for NSTEMI  CAD s/p CABG in 2003, revision in 2008 -patient with symptoms beginning earlier today.  Right sided chest pain came on at rest and resolved with nitroglycerin administration.  Patient describes it as kind of a stomach upset sensation.  He had further episodes and his wife brought him in for evaluation.  In the emergency room he had moderate Trope elevation which was stable on recheck with no true rise and fall.  EKG with Q waves in 1, aVF, V1 V2 but no dynamic changes.  Patient is otherwise symptom-free and no clear cause of underlying troponin elevation.  Patient does state that he has had a diagnosis of chronic kidney disease but in the past his troponins had not been elevated per chart review.  No other provoking symptoms which would cause supply/demand ischemia.  Per chart review patient saw his cardiologist in March.  Previous PET stress was negative for inducible ischemia in June 2024 and nuclear stress was negative for inducible with ischemia in January 24.  - Initiate heparin gtt for possible ACS  [place] [facility] [place]  - TTE tomorrow to assess for WMA  - cardiology consult  - patient aware that if TTE abnormal may require angiogram  - give AM meds (as he has not taken yet and is hypertensive) - losartan 50mg, metoprolol XL 25mg, imdur 30mg, doxasosin 2mg at bedtime.      HTN  Afib -patient is currently hypertensive 160-180 systolic.  Of note wife notes that he missed his a.m. medications and took his evening medications instead.  -Will order his usual antihypertensive regimen to be given tonight including Imdur 30 mg, losartan 50 mg (which he alternates with 25 mg every other day), metoprolol 25 mg XL  - Patient on therapeutic heparin currently, will plan to resume his apixaban on discharge    TIA  DVT -patient reports compliance with his apixaban    HLD -continue rosuvastatin 20 mg    CKD  Presumed venous insufficiency  He can only see a BMP from 2017 with a more normal creatinine at that time.  Patient states that his [place] physician has described chronic kidney disease so unclear if his current creatinine is his baseline or not.  Patient does have mild 1+ pitting edema in both legs and reports that his right leg is consistently more swollen than his left.  Given his history of graft harvesting and appearance of legs this would be consistent with venous insufficiency.  - hold lasix for now  - Recommend compression stockings as outpatient  - Repeat BMP in the morning    Cognitive deficit NOS - Noted    Consultations This Hospital Stay   PHARMACY IP CONSULT  PHARMACY IP CONSULT  CARDIOLOGY IP CONSULT    Code Status   Full Code    Time Spent on this Encounter   I, Zeke Ramey MD, MD, personally saw the patient today and spent greater than 30 minutes discharging this patient.       Zeke Ramey MD, MD  [place] [address] [facility]  [address] [place] [address]  Phone: [phone]  Fax:  [phone]  ______________________________________________________________________    Physical Exam   Vital Signs: Temp: 98.3  F (36.8  C) Temp src: Oral BP: (!) 145/63 Pulse: 68   Resp: 14 SpO2: 95 % O2 Device: None (Room air)    Weight: 163 lbs 12.8 oz  HEENT; Atraumatic, normocephalic, pinkish conjuctiva, pupils bilateral reactive   Skin: warm and moist, no rashes  Lymphatics: no cervical or axillary lymphandenopathy  Lungs: equal chest expansion, clear to auscultation, no wheezes, no stridor, no crackles,   Heart: normal rate, normal rhythm, no rubs or gallops.   Abdomen: normal bowel sounds, no tenderness, no peritoneal signs, no guarding  Extremities: no deformities, no edema   Neuro; follow commands, alert and oriented x3, spontaneous speech, coherent, moves all extremities spontaneously  Psych; no hallucination, euthymic mood, not agitated         Primary Care Physician   [facility]    Discharge Orders      Reason for your hospital stay    Robby Mccoy is a 84 year old male admitted on 6/22/2025 with PMH of coronary artery disease status CABG in 2003 with LIMA to LAD, SVG to RCA, SVG to OM.  Redo bypass surgery in 2008 with vein graft to the LAD as LIMA graft was occluded, DVT, hypertension, dyslipidemia, chronic kidney disease, TIA who presents after 15 to 20 minutes of right-sided chest pain that began this morning while at rest and resolved with NTG.  Found to have troponin elevation and was subsequently hospitalized and was monitored closely in the cardiac telemetry floor.  Currently being seen by cardiology service with intention in pursuing further risk stratification with pending Lexiscan testing today.  Fortunately he is exhibiting no worsening cardiorespiratory symptoms.  Continues to demonstrate stable hemodynamics.  No reported alteration in mental state.  Currently he is not hypoxic.  He is exhibiting no bleeding symptoms or tendencies with underlying use of  heparinization.  Hopeful for hospital discharge if no further plans or issues seen during this hospitalization.  Currently he is on good set of cardiac meds with continuation of his ARB, beta-blockers, long-term anticoagulation, DOAC, nitrates and and statins.     Activity    Your activity upon discharge: activity as tolerated     Follow Up    Follow up with cardiology as scheduled     Full Code     Diet    Follow this diet upon discharge: Current Diet:Orders Placed This Encounter      Combination Diet Regular Diet Adult; Low Saturated Fat Na <2400mg Diet     Hospital Follow-up with Existing Primary Care Provider (PCP)            Significant Results and Procedures   Most Recent 3 CBC's:  Recent Labs   Lab Test 06/22/25  1910 06/22/25  1633   WBC 5.9 5.8   HGB 12.4* 12.2*   MCV 87 87    151     Most Recent 3 BMP's:  Recent Labs   Lab Test 06/23/25  0600 06/22/25 2055 06/22/25  1633     --  140   POTASSIUM 4.1  --  4.3   CHLORIDE 106  --  102   CO2 25  --  26   BUN 25.3*  --  21.0   CR 1.43*  --  1.46*   ANIONGAP 10  --  12   ADAMARIS 9.7  --  10.0   GLC 84 84 96     Most Recent 2 LFT's:No lab results found.  Most Recent 3 Troponin's:No lab results found.  Most Recent 3 BNP's:  Recent Labs   Lab Test 06/22/25  1633   NTBNP 3,235*     Most Recent D-dimer:No lab results found.  7-Day Micro Results       No results found for the last 168 hours.          Most Recent TSH and T4:No lab results found.  Most Recent Hemoglobin A1c:No lab results found.,   Results for orders placed or performed during the hospital encounter of 06/22/25   XR Chest 2 Views    Narrative    EXAM: XR CHEST 2 VIEWS  LOCATION: [facility]  DATE: 6/22/2025    INDICATION: Right-sided chest pain.  COMPARISON: 3/3/2025      Impression    IMPRESSION: Again seen are sternotomy wires with mediastinal clips and vascular markers. The cardiac silhouette is stable in size, near the upper limit of normal. Pulmonary vascularity is  within normal limits. The lungs are clear. No evidence of pleural   effusion. Postoperative changes lower cervical spine. Mild degenerative changes thoracic spine. No acute changes identified.   US Lower Extremity Venous Duplex Right    Narrative    EXAM: US LOWER EXTREMITY VENOUS DUPLEX RIGHT  LOCATION: [facility]  DATE: 6/22/2025    INDICATION: right sided leg swelling  COMPARISON: None.  TECHNIQUE: Venous Duplex ultrasound of the right lower extremity with and without compression, augmentation and duplex. Color flow and spectral Doppler with waveform analysis performed.    FINDINGS: Exam includes the common femoral, femoral, popliteal, and contralateral common femoral veins as well as segmentally visualized deep calf veins and greater saphenous vein.     RIGHT: No deep vein thrombosis. No superficial thrombophlebitis. No popliteal cyst.      Impression    IMPRESSION:  1.  No deep venous thrombosis in the right lower extremity.       Discharge Medications      Review of your medicines        CONTINUE these medicines which have NOT CHANGED        Dose / Directions   apixaban ANTICOAGULANT 5 MG tablet  Commonly known as: ELIQUIS      Dose: 5 mg  Take 5 mg by mouth 2 times daily.  Refills: 0     Crestor 20 MG tablet  Generic drug: rosuvastatin      Dose: 20 mg  Take 20 mg by mouth every evening  Refills: 0     doxazosin 2 MG tablet  Commonly known as: CARDURA      Dose: 4 mg  Take 4 mg by mouth at bedtime.  Refills: 0     furosemide 20 MG tablet  Commonly known as: LASIX      Dose: 20 mg  Take 20 mg by mouth daily.  Refills: 0     isosorbide mononitrate 30 MG 24 hr tablet  Commonly known as: IMDUR      Dose: 30 mg  Take 30 mg by mouth daily.  Refills: 0     * losartan 25 MG tablet  Commonly known as: COZAAR      Dose: 25 mg  Take 25 mg by mouth every other day. Alternate between 25 mg one day, then 50 mg the next day.  Refills: 0     * losartan 50 MG tablet  Commonly known as: COZAAR      Dose:  50 mg  Take 50 mg by mouth every other day. Alternate between 25 mg one day, then 50 mg the next day.  Refills: 0     metoprolol succinate ER 25 MG 24 hr tablet  Commonly known as: TOPROL XL      Dose: 25 mg  Take 25 mg by mouth every evening.  Refills: 0     nitroGLYcerin 0.6 MG sublingual tablet  Commonly known as: NITROSTAT      Dose: 0.6 mg  Place 0.6 mg under the tongue every 5 minutes as needed for chest pain. For chest pain place 1 tablet under the tongue every 5 minutes for 3 doses. If symptoms persist 5 minutes after 1st dose call 911.  Refills: 0     vitamin D2 24581 units (1250 mcg) capsule  Commonly known as: ERGOCALCIFEROL      Dose: 50,000 Units  Take 50,000 Units by mouth once a week.  Refills: 0           * This list has 2 medication(s) that are the same as other medications prescribed for you. Read the directions carefully, and ask your doctor or other care provider to review them with you.                Allergies   Allergies   Allergen Reactions    Clindamycin Base Rash

## 2025-06-23 NOTE — PLAN OF CARE
Shift Summary [id number]  No complaints of chest pain  VSS  Heparin stopped for 1 hr and restarted at 600u/hr  Ambulating SBA  Awaiting cardiology input  Pt did have a few pauses on telemetry overnight, provider aware      Goal Outcome Evaluation:      Plan of Care Reviewed With: patient    Overall Patient Progress: improving    Outcome Evaluation: no chest pain overnight, heparin infusion remains running        Problem: Adult Inpatient Plan of Care  Goal: Plan of Care Review  6/23/2025 0530 by Megan Pardo, RN  Outcome: Progressing  Flowsheets (Taken 6/23/2025 0530)  Outcome Evaluation: no chest pain overnight, heparin infusion remains running  Plan of Care Reviewed With: patient  Overall Patient Progress: improving     Problem: Chest Pain  Goal: Resolution of Chest Pain Symptoms  6/23/2025 0530 by Megan Pardo, RN  Outcome: Progressing  6/23/2025 0112 by Megan Pardo, RN  Outcome: Progressing     Megan Pardo RN on 6/23/2025 at 5:32 AM    Addendum: Megan Pardo RN on 6/23/2025 at 6:47 AM

## 2025-06-23 NOTE — PLAN OF CARE
Goal Outcome Evaluation:      Plan of Care Reviewed With: patient, spouse, child    Overall Patient Progress: improving  Outcome Evaluation: No chest pain. Heparin infusion continues. Plan lexiscan tomorrow. NPO/no caffeine midnoc Tele AFib. No complaints.       Problem: Adult Inpatient Plan of Care  Goal: Plan of Care Review  Description: The Plan of Care Review/Shift note should be completed every shift.  The Outcome Evaluation is a brief statement about your assessment that the patient is improving, declining, or no change.  This information will be displayed automatically on your shift  note.  Outcome: Progressing  Flowsheets (Taken 6/23/2025 1635)  Outcome Evaluation: No chest pain. Heparin infusion continues. Plan lexiscan tomorrow. NPO/no caffeine midnoc  Plan of Care Reviewed With:   patient   spouse   child  Overall Patient Progress: improving  Goal: Absence of Hospital-Acquired Illness or Injury  Intervention: Identify and Manage Fall Risk  Recent Flowsheet Documentation  Taken 6/23/2025 1000 by Aixa Cabrera RN  Safety Promotion/Fall Prevention: safety round/check completed  Intervention: Prevent and Manage VTE (Venous Thromboembolism) Risk  Recent Flowsheet Documentation  Taken 6/23/2025 1000 by Aixa Cabrera RN  VTE Prevention/Management: (iv heparin) --  Intervention: Prevent Infection  Recent Flowsheet Documentation  Taken 6/23/2025 1000 by Aixa Cabrera RN  Infection Prevention: rest/sleep promoted     Problem: Dysrhythmia  Goal: Normalized Cardiac Rhythm  Intervention: Monitor and Manage Cardiac Rhythm Effect  Recent Flowsheet Documentation  Taken 6/23/2025 1000 by Aixa Cabrera RN  VTE Prevention/Management: (iv heparin) --

## 2025-06-23 NOTE — H&P
[facility]    History and Physical - Hospitalist Service       Date of Admission:  6/22/2025    Assessment & Plan      Robby Mccoy is a 84 year old male admitted on 6/22/2025 with PMH of coronary artery disease status CABG in 2003 with LIMA to LAD, SVG to RCA, SVG to OM.  Redo bypass surgery in 2008 with vein graft to the LAD as LIMA graft was occluded, DVT, hypertension, dyslipidemia, chronic kidney disease, TIA who presents after 15 to 20 minutes of right-sided chest pain that began this morning while at rest and resolved with NTG.  Found to have troponin elevation which is new.    Trop elevation  Right sided chest pain relieved with NTG  Concern for NSTEMI  CAD s/p CABG in 2003, revision in 2008 -patient with symptoms beginning earlier today.  Right sided chest pain came on at rest and resolved with nitroglycerin administration.  Patient describes it as kind of a stomach upset sensation.  He had further episodes and his wife brought him in for evaluation.  In the emergency room he had moderate Trope elevation which was stable on recheck with no true rise and fall.  EKG with Q waves in 1, aVF, V1 V2 but no dynamic changes.  Patient is otherwise symptom-free and no clear cause of underlying troponin elevation.  Patient does state that he has had a diagnosis of chronic kidney disease but in the past his troponins had not been elevated per chart review.  No other provoking symptoms which would cause supply/demand ischemia.  Per chart review patient saw his cardiologist in March.  Previous PET stress was negative for inducible ischemia in June 2024 and nuclear stress was negative for inducible with ischemia in January 24.  - Initiate heparin gtt for possible ACS  [place] [facility] [place]  - TTE tomorrow to assess for WMA  - cardiology consult  - patient aware that if TTE abnormal may require angiogram  - give AM meds (as he has not taken yet and is hypertensive) - losartan 50mg,  metoprolol XL 25mg, imdur 30mg, doxasosin 2mg at bedtime.      HTN  Afib -patient is currently hypertensive 160-180 systolic.  Of note wife notes that he missed his a.m. medications and took his evening medications instead.  -Will order his usual antihypertensive regimen to be given tonight including Imdur 30 mg, losartan 50 mg (which he alternates with 25 mg every other day), metoprolol 25 mg XL  - Patient on therapeutic heparin currently, will plan to resume his apixaban on discharge    TIA  DVT -patient reports compliance with his apixaban    HLD -continue rosuvastatin 20 mg    CKD  Presumed venous insufficiency  He can only see a BMP from 2017 with a more normal creatinine at that time.  Patient states that his [place] physician has described chronic kidney disease so unclear if his current creatinine is his baseline or not.  Patient does have mild 1+ pitting edema in both legs and reports that his right leg is consistently more swollen than his left.  Given his history of graft harvesting and appearance of legs this would be consistent with venous insufficiency.  - hold lasix for now  - Recommend compression stockings as outpatient  - Repeat BMP in the morning    Cognitive deficit NOS - Noted        Diet:  Cardiac diet  DVT Prophylaxis: On therapeutic heparin  Carpenter Catheter: Not present  Lines: None     Cardiac Monitoring: None  Code Status:  Full code    Clinically Significant Risk Factors Present on Admission                 # Drug Induced Platelet Defect: home medication list includes an antiplatelet medication   # Hypertension: Home medication list includes antihypertensive(s)                # History of CABG: noted on surgical history       Disposition Plan     Medically Ready for Discharge: Anticipated in 2-4 Days           Apolinar Zavaleta MD  Hospitalist Service  [facility]  Securely message with Bizible (more info)  Text page via App[employer] Paging/nuPSYSy      ______________________________________________________________________    Chief Complaint   Chest pain    History is obtained from the patient, wife and daughter    History of Present Illness   Robby Mccoy is a 84 year old male with PMH of coronary artery disease status CABG in 2003 with LIMA to LAD, SVG to RCA, SVG to OM.  Redo bypass surgery in 2008 with vein graft to the LAD as LIMA graft was occluded, DVT, hypertension, dyslipidemia, chronic kidney disease, TIA who presents after 15 to 20 minutes of right-sided chest pain that began this morning while at rest.  He states he was watching golf and felt a sense of indigestion which is unusual for him.  He took a nitroglycerin which resolved the pain.  Over the next hour or so the pain recurred in the same way way for about 15 to 20 seconds and then relieved without additional intervention.    His wife notes that they usually go for a walk every day and yesterday he was more short of breath on their normal route which was unusual for him.  Outside of that he denies any change in exercise tolerance.  He has not taken his nitroglycerin for years.  He denies any current orthopnea, shortness of breath, diaphoresis.    In the emergency room workup was notable for creatinine of 1.46, BNP elevated at 3230, initial Trope elevated at 67.  Initial EKG demonstrated Q waves in leads III, aVF, V1, V2.  Also had T wave inversions in V1.  Repeat troponin returned at 66 and repeat EKG without any new or dynamic changes.  I am intermediate    Patient states that he is otherwise in his usual state of health and denies any others subjective complaints other than increased right lower extremity swelling.  Lower extremity Doppler was negative for PE.  Chest x-ray was unrevealing.  Patient reports compliance with his usual medications of apixaban, Imdur, doxazosin, losartan, rosuvastatin, Lasix, metoprolol.  His wife does note that he took his evening medications and missed  "all of his usual a.m. blood pressure medications this morning.      Past Medical History    Past Medical History:   Diagnosis Date    Allergic rhinitis     ASHD (arteriosclerotic heart disease)     Carotid stenosis, bilateral     Cataracts, bilateral     Cervical spondylosis     Displacement of lumbar intervertebral disc without myelopathy     Diverticulum of esophagus, acquired     Gastroesophageal reflux disease     Hematuria     Hiatal hernia     History of blood transfusion     after a surgery, pt does not remember the specific surgery. \"Many years ago\"    Hyperbilirubinemia     Hyperlipidemia     Hypertension     Hypertrophy of prostate with urinary obstruction     Other chest pain     Primary pulmonary coccidioidomycosis     Proteinuria     Sensorineural hearing loss, asymmetrical     Subjective tinnitus     Tendonitis     TIA (transient ischemic attack)     TMJ (dislocation of temporomandibular joint)        Past Surgical History   Past Surgical History:   Procedure Laterality Date    ARTHROSCOPY KNEE      BYPASS GRAFT ARTERY CORONARY  2003    second CABG done a few years later    CARPAL TUNNEL RELEASE RT/LT      CHOLECYSTECTOMY      DISKECTOMY, LUMBAR, SINGLE SP      Cervical--surgery x3, Lumbar x2    ESOPHAGOSCOPY      ESOPHAGOSCOPY, GASTROSCOPY, DUODENOSCOPY (EGD), COMBINED N/A 9/20/2021    Procedure: ESOPHAGOGASTRODUODENOSCOPY with biopsy;  Surgeon: Lionel Bazzi MD;  Location: [facility] OR    EYE SURGERY      Repair of eye after accident      HEMORRHOIDECTOMY      LASER KTP GREEN LIGHT PHOTOSELECTIVE VAPORIZATION PROSTATE N/A 7/20/2018    Procedure: LASER KTP GREEN LIGHT PHOTOSELECTIVE VAPORIZATION PROSTATE;  CYSTOSCOPY TRANSURETHRAL RESECTION OF PROSTATE ;  Surgeon: Joe Martinez MD;  Location: [facility] OR    NECK SURGERY      ORTHOPEDIC SURGERY      RELEASE TRIGGER FINGER      TURP      XR SHOULDER SURGERY DANICA RIGHT         Prior to Admission Medications   Prior to Admission Medications   Prescriptions " Last Dose Informant Patient Reported? Taking?   Acetaminophen (TYLENOL PO)  Self Yes No   Sig: Take 500 mg by mouth 2 times daily    Apoaequorin (PREVAGEN PO)   Yes No   Sig: Take by mouth daily   Cyanocobalamin 5000 MCG SUBL   Yes No   Sig: Place under the tongue daily    Flaxseed, Linseed, (FLAXSEED OIL PO)  Self Yes No   Sig: Take 1,400 mg by mouth daily    Melatonin 10 MG CAPS   Yes No   Sig: Take by mouth At Bedtime    OMEPRAZOLE PO  Self Yes No   Sig: Take 20 mg by mouth every morning   aspirin (ASA) 81 MG EC tablet   Yes No   Sig: Take 81 mg by mouth   desonide (DESOWEN) 0.05 % external ointment   Yes No   doxazosin (CARDURA) 2 MG tablet   Yes No   Sig: Take 2 mg by mouth At Bedtime    losartan (COZAAR) 50 MG tablet   Yes No   Sig: Take 50 mg by mouth daily    rosuvastatin (CRESTOR) 20 MG tablet  Self Yes No   Sig: Take 20 mg by mouth every evening       Facility-Administered Medications: None        Review of Systems    The 10 point Review of Systems is negative other than noted in the HPI or here.      Physical Exam   Vital Signs: Temp: 97.5  F (36.4  C) Temp src: Temporal BP: (!) 168/81 Pulse: 54   Resp: 11 SpO2: 99 % O2 Device: None (Room air)    Weight: 167 lbs 5.27 oz    General Appearance: He is sitting up in bed, no acute distress  Respiratory: No increased work of breathing, clear to auscultation  Cardiovascular: He is regular when I saw him no murmurs rubs or gallops.  Junctional rhythm on telemetry.  GI: Soft, nontender  Skin: No rashes or lesions  Other: Mild cognitive deficit apparent but neuroexam otherwise nonfocal    Medical Decision Making       75 MINUTES SPENT BY ME on the date of service doing chart review, history, exam, documentation & further activities per the note.      Data     I have personally reviewed the following data over the past 24 hrs:    5.9  \   12.4 (L)   / 152     140 102 21.0 /  96   4.3 26 1.46 (H) \     Trop: 66 (H) BNP: 3,235 (H)       Imaging results reviewed over  the past 24 hrs:   Recent Results (from the past 24 hours)   US Lower Extremity Venous Duplex Right    Narrative    EXAM: US LOWER EXTREMITY VENOUS DUPLEX RIGHT  LOCATION: [facility]  DATE: 6/22/2025    INDICATION: right sided leg swelling  COMPARISON: None.  TECHNIQUE: Venous Duplex ultrasound of the right lower extremity with and without compression, augmentation and duplex. Color flow and spectral Doppler with waveform analysis performed.    FINDINGS: Exam includes the common femoral, femoral, popliteal, and contralateral common femoral veins as well as segmentally visualized deep calf veins and greater saphenous vein.     RIGHT: No deep vein thrombosis. No superficial thrombophlebitis. No popliteal cyst.      Impression    IMPRESSION:  1.  No deep venous thrombosis in the right lower extremity.   XR Chest 2 Views    Narrative    EXAM: XR CHEST 2 VIEWS  LOCATION: [facility]  DATE: 6/22/2025    INDICATION: Right-sided chest pain.  COMPARISON: 3/3/2025      Impression    IMPRESSION: Again seen are sternotomy wires with mediastinal clips and vascular markers. The cardiac silhouette is stable in size, near the upper limit of normal. Pulmonary vascularity is within normal limits. The lungs are clear. No evidence of pleural   effusion. Postoperative changes lower cervical spine. Mild degenerative changes thoracic spine. No acute changes identified.

## 2025-06-23 NOTE — PHARMACY-ADMISSION MEDICATION HISTORY
"Pharmacist Admission Medication History    Admission medication history is complete. The information provided in this note is only as accurate as the sources available at the time of the update.    Information Source(s): Patient, Family member, and CareEverywhere/SureScripts via in-person and phone    Pertinent Information: I spoke with Hudson, but he said I should call his wife, Korin, who manages his medications and had a list. I spoke with Korin and she read off his medication list and checked bottles/pill box at home. Korin confirmed all updates to the PTA list. She also noted Hudson accidentally took his PM doses today in the morning. This includes his doses of: rosuvastatin 20 mg, doxazosin 4 mg (increase from 2 mg on PTA list originally and [facility] disepsend 4 mg), Eliquis 5 mg (twice daily dosing so he had his \"AM dose\"), and metoprolol succinate 25 mg.     She also said he no longer takes omeprazole 20 mg or aspirin 81 mg at home therefore I removed from his PTA list. Lastly, she said he alternates losartan doses of 25 mg one day, then 50 mg the next day. Maria Guadalupeera page to Dr. Zavaleta at 22:34.    Changes made to PTA medication list:  Added: Eliquis, vit D2, furosemide, isosorbide mononitrate ER, metoprolol succinate, nitroglycerine  Deleted: Tylenol, Prevagen, aspirin, B-12, desonide oint., flaxseed oil, melatonin, omeprazole (not taking, per Korin)  Changed: doxazosin (increase from 2 mg to 4 mg), losartan (changed from 50 mg daily to alternating between 25 mg and 50 mg daily.    Allergies reviewed with patient and updates made in EHR: yes    Medication History Completed By: Howie Burgos [facility] 6/22/2025 10:30 PM    PTA Med List   Medication Sig Last Dose/Taking    apixaban ANTICOAGULANT (ELIQUIS) 5 MG tablet Take 5 mg by mouth 2 times daily. 6/22/2025 Morning    doxazosin (CARDURA) 2 MG tablet Take 4 mg by mouth at bedtime. 6/22/2025 Morning    furosemide (LASIX) 20 MG tablet Take 20 mg by mouth daily. 6/21/2025 " Morning    isosorbide mononitrate (IMDUR) 30 MG 24 hr tablet Take 30 mg by mouth daily. 6/21/2025 Morning    losartan (COZAAR) 25 MG tablet Take 25 mg by mouth every other day. Alternate between 25 mg one day, then 50 mg the next day. 6/21/2025 Morning    losartan (COZAAR) 50 MG tablet Take 50 mg by mouth every other day. Alternate between 25 mg one day, then 50 mg the next day. 6/20/2025 Morning    metoprolol succinate ER (TOPROL XL) 25 MG 24 hr tablet Take 25 mg by mouth every evening. 6/22/2025 Morning    nitroGLYcerin (NITROSTAT) 0.6 MG sublingual tablet Place 0.6 mg under the tongue every 5 minutes as needed for chest pain. For chest pain place 1 tablet under the tongue every 5 minutes for 3 doses. If symptoms persist 5 minutes after 1st dose call 911. 6/22/2025 Morning    rosuvastatin (CRESTOR) 20 MG tablet Take 20 mg by mouth every evening  6/22/2025 Morning    vitamin D2 (ERGOCALCIFEROL) 84200 units (1250 mcg) capsule Take 50,000 Units by mouth once a week. Past Week

## 2025-06-23 NOTE — CONSULTS
[facility]    Cardiology Consultation     Date of Admission:  6/22/2025    Assessment & Plan   Chronic coronary artery disease  Permanent atrial fibrillation  Chronic renal failure  Hypertension    This gentleman's chest discomfort is atypical.  I do not think it is cardiac related.  He more than likely has a chronic troponin elevation in view of the above medical conditions  He is stable at this time.  I will request a stress nuclear study for further evaluation.  Discussed with daughters who are present.    DR APARNA CHADWICK MD, MD    Primary Care Physician   [facility]    Reason for Consult   Reason for consult: I was asked by hospitalist service to evaluate this patient for chest pain.    History of Present Illness   Robby Mccoy is a 84 year old male who presents with chest pain.    This very pleasant gentleman has a history of coronary artery disease status CABG in 2003 with LIMA to LAD, SVG to RCA, SVG to OM.  Redo bypass surgery in 2008 with vein graft to the LAD as LIMA graft was occluded, atrial fibrillation, DVT, hypertension, dyslipidemia, chronic kidney disease.    He resides with his wife and is usually quite physically active.  He experienced several episodes of right-sided chest discomfort lasting no more than 20 seconds over perhaps an hour's time span.  He did take 3 sublingual nitroglycerin which resolved his symptoms completely.  No recent coughs colds fevers or chills.    He did have a similar episode in March when he presented to [facility].  His troponin levels were elevated at that time as well.  I do see an echocardiogram performed during that admission which shows a normal left ventricular systolic function without any major valvular lesions.  He had a stress nuclear study done about a year ago which showed normal perfusion      Home medications include apixaban, Cardura, Lasix 20 mg daily, isosorbide mononitrate 30 mg daily,  "losartan 25 mg daily, losartan 50 mg every other day, Toprol-XL 25 mg daily, Crestor 20 mg daily,    EKG showed atrial fibrillation, nonspecific ST segment changes, serial EKGs showed no significant acute changes    Sodium 141, potassium 4.1, creatinine 1.43, BUN 25.3, GFR estimate 48.  NT proBNP 3235  Troponin 67, 66  White cell count 5.9, hemoglobin 12.4, platelet count 152           Past Medical History   Past Medical History:   Diagnosis Date    Allergic rhinitis     ASHD (arteriosclerotic heart disease)     Carotid stenosis, bilateral     Cataracts, bilateral     Cervical spondylosis     Displacement of lumbar intervertebral disc without myelopathy     Diverticulum of esophagus, acquired     Gastroesophageal reflux disease     Hematuria     Hiatal hernia     History of blood transfusion     after a surgery, pt does not remember the specific surgery. \"Many years ago\"    Hyperbilirubinemia     Hyperlipidemia     Hypertension     Hypertrophy of prostate with urinary obstruction     Other chest pain     Primary pulmonary coccidioidomycosis     Proteinuria     Sensorineural hearing loss, asymmetrical     Subjective tinnitus     Tendonitis     TIA (transient ischemic attack)     TMJ (dislocation of temporomandibular joint)          Past Surgical History   Past Surgical History:   Procedure Laterality Date    ARTHROSCOPY KNEE      BYPASS GRAFT ARTERY CORONARY  2003    second CABG done a few years later    CARPAL TUNNEL RELEASE RT/LT      CHOLECYSTECTOMY      DISKECTOMY, LUMBAR, SINGLE SP      Cervical--surgery x3, Lumbar x2    ESOPHAGOSCOPY      ESOPHAGOSCOPY, GASTROSCOPY, DUODENOSCOPY (EGD), COMBINED N/A 9/20/2021    Procedure: ESOPHAGOGASTRODUODENOSCOPY with biopsy;  Surgeon: Lionel Bazzi MD;  Location: [facility] OR    EYE SURGERY      Repair of eye after accident      HEMORRHOIDECTOMY      LASER KTP GREEN LIGHT PHOTOSELECTIVE VAPORIZATION PROSTATE N/A 7/20/2018    Procedure: LASER KTP GREEN LIGHT PHOTOSELECTIVE " VAPORIZATION PROSTATE;  CYSTOSCOPY TRANSURETHRAL RESECTION OF PROSTATE ;  Surgeon: Joe Martinez MD;  Location: [facility] OR    NECK SURGERY      ORTHOPEDIC SURGERY      RELEASE TRIGGER FINGER      TURP      XR SHOULDER SURGERY DANICA RIGHT           Prior to Admission Medications   Prior to Admission Medications   Prescriptions Last Dose Informant Patient Reported? Taking?   apixaban ANTICOAGULANT (ELIQUIS) 5 MG tablet 6/22/2025 Morning Spouse/Significant Other Yes Yes   Sig: Take 5 mg by mouth 2 times daily.   doxazosin (CARDURA) 2 MG tablet 6/22/2025 Morning  Yes Yes   Sig: Take 4 mg by mouth at bedtime.   furosemide (LASIX) 20 MG tablet 6/21/2025 Morning Spouse/Significant Other Yes Yes   Sig: Take 20 mg by mouth daily.   isosorbide mononitrate (IMDUR) 30 MG 24 hr tablet 6/21/2025 Morning Spouse/Significant Other Yes Yes   Sig: Take 30 mg by mouth daily.   losartan (COZAAR) 25 MG tablet 6/21/2025 Morning Spouse/Significant Other Yes Yes   Sig: Take 25 mg by mouth every other day. Alternate between 25 mg one day, then 50 mg the next day.   losartan (COZAAR) 50 MG tablet 6/20/2025 Morning Spouse/Significant Other Yes Yes   Sig: Take 50 mg by mouth every other day. Alternate between 25 mg one day, then 50 mg the next day.   metoprolol succinate ER (TOPROL XL) 25 MG 24 hr tablet 6/22/2025 Morning Spouse/Significant Other Yes Yes   Sig: Take 25 mg by mouth every evening.   nitroGLYcerin (NITROSTAT) 0.6 MG sublingual tablet 6/22/2025 Morning Spouse/Significant Other Yes Yes   Sig: Place 0.6 mg under the tongue every 5 minutes as needed for chest pain. For chest pain place 1 tablet under the tongue every 5 minutes for 3 doses. If symptoms persist 5 minutes after 1st dose call 911.   rosuvastatin (CRESTOR) 20 MG tablet 6/22/2025 Morning Self Yes Yes   Sig: Take 20 mg by mouth every evening    vitamin D2 (ERGOCALCIFEROL) 52107 units (1250 mcg) capsule Past Week Spouse/Significant Other Yes Yes   Sig: Take 50,000 Units by mouth  once a week.      Facility-Administered Medications: None     Current Facility-Administered Medications   Medication Dose Route Frequency Provider Last Rate Last Admin    calcium carbonate (TUMS) chewable tablet 1,000 mg  1,000 mg Oral 4x Daily PRN Apolinar Zavaleta MD        doxazosin (CARDURA) tablet 2 mg  2 mg Oral Daily Apolinar Zavaleta MD        heparin 25,000 units in 0.45% NaCl 250 mL ANTICOAGULANT infusion  0-5,000 Units/hr Intravenous Continuous Apolinar Zavaleta MD 6 mL/hr at 06/23/25 0820 600 Units/hr at 06/23/25 0820    hydrALAZINE (APRESOLINE) tablet 10 mg  10 mg Oral Q4H PRN Apolinar Zavaleta MD        Or    hydrALAZINE (APRESOLINE) injection 10 mg  10 mg Intravenous Q4H PRN Apolinar Zavaleta MD        isosorbide mononitrate (IMDUR) 24 hr tablet 30 mg  30 mg Oral Daily Apolinar Zavaleta MD   30 mg at 06/23/25 0812    lidocaine (LMX4) cream   Topical Q1H PRN Apolinar Zavaleta MD        lidocaine (LMX4) cream   Topical Q1H PRN Apolinar Zavaleta MD        lidocaine 1 % 0.1-1 mL  0.1-1 mL Other Q1H PRN Apolinar Zavaleta MD        lidocaine 1 % 0.1-1 mL  0.1-1 mL Other Q1H PRN Apolinar Zavaleta MD        losartan (COZAAR) tablet 50 mg  50 mg Oral Daily Apolinar Zavaleta MD        melatonin tablet 1 mg  1 mg Oral At Bedtime PRN Apolinar Zavaleta MD        metoprolol succinate ER (TOPROL XL) 24 hr tablet 25 mg  25 mg Oral Daily Apolinar Zavaleta MD   25 mg at 06/23/25 0812    nitroGLYcerin (NITROSTAT) sublingual tablet 0.4 mg  0.4 mg Sublingual Q5 Min PRN Apolinar Zavaleta MD        ondansetron (ZOFRAN ODT) ODT tab 4 mg  4 mg Oral Q6H PRN Apolinar Zavaleta MD        Or    ondansetron (ZOFRAN) injection 4 mg  4 mg Intravenous Q6H PRN Apolinar Zavaleta MD        pantoprazole (PROTONIX) EC tablet 40 mg  40 mg Oral QAM Apolinar Zavaleta MD   40 mg at 06/23/25 0812    Patient is already receiving anticoagulation with heparin, enoxaparin (LOVENOX), warfarin (COUMADIN)  or other anticoagulant  medication   Does not apply Continuous PRN Apolinar Zavaleta MD        rosuvastatin (CRESTOR) tablet 20 mg  20 mg Oral QPM Apolinar Zavaleta MD        senna-docusate (SENOKOT-S/PERICOLACE) 8.6-50 MG per tablet 1 tablet  1 tablet Oral BID PRN Apolinar Zavaleta MD        Or    senna-docusate (SENOKOT-S/PERICOLACE) 8.6-50 MG per tablet 2 tablet  2 tablet Oral BID PRN Apolinar Zavaleta MD        sodium chloride (PF) 0.9% PF flush 3 mL  3 mL Intracatheter Q8H EDY Apolinar Zavaleta MD        sodium chloride (PF) 0.9% PF flush 3 mL  3 mL Intracatheter q1 min prn Apolinar Zavaleta MD        sodium chloride (PF) 0.9% PF flush 3 mL  3 mL Intracatheter Q8H EDY Apolinar Zavaleta MD        sodium chloride (PF) 0.9% PF flush 3 mL  3 mL Intracatheter q1 min prn Apolinar Zavaleta MD         Current Facility-Administered Medications   Medication Dose Route Frequency Provider Last Rate Last Admin    calcium carbonate (TUMS) chewable tablet 1,000 mg  1,000 mg Oral 4x Daily PRN Apolinar Zavaleta MD        doxazosin (CARDURA) tablet 2 mg  2 mg Oral Daily Apolinar Zavaleta MD        heparin 25,000 units in 0.45% NaCl 250 mL ANTICOAGULANT infusion  0-5,000 Units/hr Intravenous Continuous Apolinar Zavaleta MD 6 mL/hr at 06/23/25 0820 600 Units/hr at 06/23/25 0820    hydrALAZINE (APRESOLINE) tablet 10 mg  10 mg Oral Q4H PRN Apolinar Zavaleta MD        Or    hydrALAZINE (APRESOLINE) injection 10 mg  10 mg Intravenous Q4H PRN Apolinar Zavaleta MD        isosorbide mononitrate (IMDUR) 24 hr tablet 30 mg  30 mg Oral Daily Apolinar Zavaleta MD   30 mg at 06/23/25 0812    lidocaine (LMX4) cream   Topical Q1H PRN Apolinar Zavaleta MD        lidocaine (LMX4) cream   Topical Q1H PRN Apolinar Zavaleta MD        lidocaine 1 % 0.1-1 mL  0.1-1 mL Other Q1H PRN Apolinar Zavaleta MD        lidocaine 1 % 0.1-1 mL  0.1-1 mL Other Q1H PRN Apolinar Zavaleta MD        losartan (COZAAR) tablet 50 mg  50 mg Oral Daily Apolinar Zavaleta MD         melatonin tablet 1 mg  1 mg Oral At Bedtime PRN Apolinar Zavaleta MD        metoprolol succinate ER (TOPROL XL) 24 hr tablet 25 mg  25 mg Oral Daily Apolinar Zavaleta MD   25 mg at 06/23/25 0812    nitroGLYcerin (NITROSTAT) sublingual tablet 0.4 mg  0.4 mg Sublingual Q5 Min PRN Apolinar Zavaleta MD        ondansetron (ZOFRAN ODT) ODT tab 4 mg  4 mg Oral Q6H PRN Apolinar Zavaleta MD        Or    ondansetron (ZOFRAN) injection 4 mg  4 mg Intravenous Q6H PRN Apolinar Zavaleta MD        pantoprazole (PROTONIX) EC tablet 40 mg  40 mg Oral QAM Apolinar Zavaleta MD   40 mg at 06/23/25 0812    Patient is already receiving anticoagulation with heparin, enoxaparin (LOVENOX), warfarin (COUMADIN)  or other anticoagulant medication   Does not apply Continuous PRN Apolinar Zavaleta MD        rosuvastatin (CRESTOR) tablet 20 mg  20 mg Oral QPM Apolinar Zavaleta MD        senna-docusate (SENOKOT-S/PERICOLACE) 8.6-50 MG per tablet 1 tablet  1 tablet Oral BID PRN Apolinar Zavaleta MD        Or    senna-docusate (SENOKOT-S/PERICOLACE) 8.6-50 MG per tablet 2 tablet  2 tablet Oral BID PRN Apolinar Zavaleta MD        sodium chloride (PF) 0.9% PF flush 3 mL  3 mL Intracatheter Q8H Apolinar Martinez MD        sodium chloride (PF) 0.9% PF flush 3 mL  3 mL Intracatheter q1 min prn Apolinar Zavaleta MD        sodium chloride (PF) 0.9% PF flush 3 mL  3 mL Intracatheter Q8H [facility] Apolinar Zavaleta MD        sodium chloride (PF) 0.9% PF flush 3 mL  3 mL Intracatheter q1 min prn Apolinar Zavaleta MD         Allergies   Allergies   Allergen Reactions    Clindamycin Base Rash       Social History    reports that he quit smoking about 37 years ago. He has never used smokeless tobacco. He reports that he does not currently use alcohol. He reports that he does not use drugs.      Family History            Review of Systems   A comprehensive review of system was performed and is negative other than that noted in the HPI or here.  "    Physical Exam   Vital Signs with Ranges  Temp:  [97.5  F (36.4  C)-98.3  F (36.8  C)] 98.3  F (36.8  C)  Pulse:  [48-63] 61  Resp:  [11-16] 12  BP: (133-173)/(62-92) 148/68  SpO2:  [95 %-100 %] 99 %  Wt Readings from Last 4 Encounters:   06/22/25 74.3 kg (163 lb 12.8 oz)   09/20/21 74.8 kg (165 lb)   07/20/18 80.8 kg (178 lb 3.2 oz)   05/31/18 80.3 kg (177 lb)     I/O last 3 completed shifts:  In: 60.45 [I.V.:60.45]  Out: -       Vitals: BP (!) 148/68   Pulse 61   Temp 98.3  F (36.8  C) (Oral)   Resp 12   Ht 1.753 m (5' 9\")   Wt 74.3 kg (163 lb 12.8 oz)   SpO2 99%   BMI 24.19 kg/m      Physical Exam:   General - Alert and oriented to time place and person in no acute distress  Eyes - No scleral icterus  HEENT - Neck supple, moist mucous membranes  Cardiovascular -well-healed surgical scars, cardiac apex nonpalpable, unremarkable heart sounds  Extremities - There is trace bilateral edema  Respiratory -symmetrical expansion without the use of accessory muscles, unremarkable breath sounds  Skin - No pallor or cyanosis  Gastrointestinal - Non tender and non distended without rebound or guarding  Psych - Appropriate affect   Neurological - No gross motor neurological focal deficits    No lab results found in last 7 days.    Invalid input(s): \"TROPONINIES\"    Recent Labs   Lab 06/23/25  0600 06/22/25 2055 06/22/25  1910 06/22/25  1633   WBC  --   --  5.9 5.8   HGB  --   --  12.4* 12.2*   MCV  --   --  87 87   PLT  --   --  152 151     --   --  140   POTASSIUM 4.1  --   --  4.3   CHLORIDE 106  --   --  102   CO2 25  --   --  26   BUN 25.3*  --   --  21.0   CR 1.43*  --   --  1.46*   GFRESTIMATED 48*  --   --  47*   ANIONGAP 10  --   --  12   ADAMARIS 9.7  --   --  10.0   GLC 84 84  --  96     No results for input(s): \"CHOL\", \"HDL\", \"LDL\", \"TRIG\", \"CHOLHDLRATIO\" in the last 76290 hours.  Recent Labs   Lab 06/22/25  1910 06/22/25  1633   WBC 5.9 5.8   HGB 12.4* 12.2*   HCT 36.9* 36.8*   MCV 87 87    151 " "    No results for input(s): \"PH\", \"PHV\", \"PO2\", \"PO2V\", \"SAT\", \"PCO2\", \"PCO2V\", \"HCO3\", \"HCO3V\" in the last 168 hours.  Recent Labs   Lab 06/22/25  1633   NTBNP 3,235*     No results for input(s): \"DD\" in the last 168 hours.  No results for input(s): \"SED\", \"CRP\" in the last 168 hours.  Recent Labs   Lab 06/22/25  1910 06/22/25  1633    151     No results for input(s): \"TSH\" in the last 168 hours.  No results for input(s): \"COLOR\", \"APPEARANCE\", \"URINEGLC\", \"URINEBILI\", \"URINEKETONE\", \"SG\", \"UBLD\", \"URINEPH\", \"PROTEIN\", \"UROBILINOGEN\", \"NITRITE\", \"LEUKEST\", \"RBCU\", \"WBCU\" in the last 168 hours.    Imaging:  Recent Results (from the past 48 hours)   US Lower Extremity Venous Duplex Right    Narrative    EXAM: US LOWER EXTREMITY VENOUS DUPLEX RIGHT  LOCATION: [facility]  DATE: 6/22/2025    INDICATION: right sided leg swelling  COMPARISON: None.  TECHNIQUE: Venous Duplex ultrasound of the right lower extremity with and without compression, augmentation and duplex. Color flow and spectral Doppler with waveform analysis performed.    FINDINGS: Exam includes the common femoral, femoral, popliteal, and contralateral common femoral veins as well as segmentally visualized deep calf veins and greater saphenous vein.     RIGHT: No deep vein thrombosis. No superficial thrombophlebitis. No popliteal cyst.      Impression    IMPRESSION:  1.  No deep venous thrombosis in the right lower extremity.   XR Chest 2 Views    Narrative    EXAM: XR CHEST 2 VIEWS  LOCATION: [facility]  DATE: 6/22/2025    INDICATION: Right-sided chest pain.  COMPARISON: 3/3/2025      Impression    IMPRESSION: Again seen are sternotomy wires with mediastinal clips and vascular markers. The cardiac silhouette is stable in size, near the upper limit of normal. Pulmonary vascularity is within normal limits. The lungs are clear. No evidence of pleural   effusion. Postoperative changes lower cervical spine. " Mild degenerative changes thoracic spine. No acute changes identified.       Echo:  No results found for this or any previous visit (from the past 4320 hours).    Clinically Significant Risk Factors Present on Admission                # Drug Induced Coagulation Defect: home medication list includes an anticoagulant medication    # Hypertension: Home medication list includes antihypertensive(s)                # History of CABG: noted on surgical history

## 2025-06-24 ENCOUNTER — APPOINTMENT (OUTPATIENT)
Dept: NUCLEAR MEDICINE | Facility: CLINIC | Age: 85
End: 2025-06-24
Attending: INTERNAL MEDICINE
Payer: COMMERCIAL

## 2025-06-24 VITALS
RESPIRATION RATE: 16 BRPM | HEIGHT: 69 IN | TEMPERATURE: 97.9 F | SYSTOLIC BLOOD PRESSURE: 137 MMHG | OXYGEN SATURATION: 98 % | DIASTOLIC BLOOD PRESSURE: 67 MMHG | HEART RATE: 61 BPM | BODY MASS INDEX: 24.32 KG/M2 | WEIGHT: 164.2 LBS

## 2025-06-24 LAB
ANION GAP SERPL CALCULATED.3IONS-SCNC: 11 MMOL/L (ref 7–15)
APTT PPP: 67 SECONDS (ref 22–38)
BUN SERPL-MCNC: 28.9 MG/DL (ref 8–23)
CALCIUM SERPL-MCNC: 9.3 MG/DL (ref 8.8–10.4)
CHLORIDE SERPL-SCNC: 106 MMOL/L (ref 98–107)
CREAT SERPL-MCNC: 1.47 MG/DL (ref 0.67–1.17)
CV BLOOD PRESSURE: 50 MMHG
EGFRCR SERPLBLD CKD-EPI 2021: 46 ML/MIN/1.73M2
GLUCOSE SERPL-MCNC: 84 MG/DL (ref 70–99)
HCO3 SERPL-SCNC: 23 MMOL/L (ref 22–29)
NUC STRESS EJECTION FRACTION: 51 %
POTASSIUM SERPL-SCNC: 4.1 MMOL/L (ref 3.4–5.3)
SODIUM SERPL-SCNC: 140 MMOL/L (ref 135–145)
STRESS ECHO TARGET HR: 135
STRESS/REST PERFUSION RATIO: 1.14

## 2025-06-24 PROCEDURE — 78452 HT MUSCLE IMAGE SPECT MULT: CPT

## 2025-06-24 PROCEDURE — 85730 THROMBOPLASTIN TIME PARTIAL: CPT | Performed by: INTERNAL MEDICINE

## 2025-06-24 PROCEDURE — 99232 SBSQ HOSP IP/OBS MODERATE 35: CPT | Mod: FS | Performed by: NURSE PRACTITIONER

## 2025-06-24 PROCEDURE — G0378 HOSPITAL OBSERVATION PER HR: HCPCS

## 2025-06-24 PROCEDURE — 36415 COLL VENOUS BLD VENIPUNCTURE: CPT | Performed by: HOSPITALIST

## 2025-06-24 PROCEDURE — 250N000011 HC RX IP 250 OP 636: Performed by: HOSPITALIST

## 2025-06-24 PROCEDURE — 80048 BASIC METABOLIC PNL TOTAL CA: CPT | Performed by: HOSPITALIST

## 2025-06-24 PROCEDURE — 93018 CV STRESS TEST I&R ONLY: CPT | Performed by: INTERNAL MEDICINE

## 2025-06-24 PROCEDURE — 343N000001 HC RX 343 MED OP 636: Performed by: INTERNAL MEDICINE

## 2025-06-24 PROCEDURE — 78452 HT MUSCLE IMAGE SPECT MULT: CPT | Mod: 26 | Performed by: INTERNAL MEDICINE

## 2025-06-24 PROCEDURE — 250N000013 HC RX MED GY IP 250 OP 250 PS 637: Performed by: INTERNAL MEDICINE

## 2025-06-24 PROCEDURE — 99239 HOSP IP/OBS DSCHRG MGMT >30: CPT | Performed by: INTERNAL MEDICINE

## 2025-06-24 PROCEDURE — A9500 TC99M SESTAMIBI: HCPCS | Performed by: INTERNAL MEDICINE

## 2025-06-24 PROCEDURE — 250N000013 HC RX MED GY IP 250 OP 250 PS 637: Performed by: HOSPITALIST

## 2025-06-24 PROCEDURE — 96366 THER/PROPH/DIAG IV INF ADDON: CPT

## 2025-06-24 PROCEDURE — 93016 CV STRESS TEST SUPVJ ONLY: CPT | Performed by: INTERNAL MEDICINE

## 2025-06-24 PROCEDURE — 93017 CV STRESS TEST TRACING ONLY: CPT

## 2025-06-24 RX ORDER — CAFFEINE CITRATE 20 MG/ML
60 SOLUTION INTRAVENOUS
Status: DISCONTINUED | OUTPATIENT
Start: 2025-06-24 | End: 2025-06-24 | Stop reason: HOSPADM

## 2025-06-24 RX ORDER — ALBUTEROL SULFATE 90 UG/1
2 INHALANT RESPIRATORY (INHALATION) EVERY 5 MIN PRN
Status: DISCONTINUED | OUTPATIENT
Start: 2025-06-24 | End: 2025-06-24 | Stop reason: HOSPADM

## 2025-06-24 RX ORDER — CAFFEINE 200 MG
200 TABLET ORAL
Status: DISCONTINUED | OUTPATIENT
Start: 2025-06-24 | End: 2025-06-24 | Stop reason: HOSPADM

## 2025-06-24 RX ORDER — SODIUM CHLORIDE 9 MG/ML
INJECTION, SOLUTION INTRAVENOUS CONTINUOUS PRN
Status: ACTIVE | OUTPATIENT
Start: 2025-06-24 | End: 2025-06-24

## 2025-06-24 RX ORDER — AMINOPHYLLINE 25 MG/ML
50-100 INJECTION, SOLUTION INTRAVENOUS
Status: DISCONTINUED | OUTPATIENT
Start: 2025-06-24 | End: 2025-06-24 | Stop reason: HOSPADM

## 2025-06-24 RX ORDER — AMINOPHYLLINE 25 MG/ML
INJECTION, SOLUTION INTRAVENOUS
Status: DISCONTINUED
Start: 2025-06-24 | End: 2025-06-24 | Stop reason: HOSPADM

## 2025-06-24 RX ORDER — REGADENOSON 0.08 MG/ML
0.4 INJECTION, SOLUTION INTRAVENOUS ONCE
Status: DISCONTINUED | OUTPATIENT
Start: 2025-06-24 | End: 2025-06-24 | Stop reason: HOSPADM

## 2025-06-24 RX ORDER — REGADENOSON 0.08 MG/ML
INJECTION, SOLUTION INTRAVENOUS
Status: COMPLETED
Start: 2025-06-24 | End: 2025-06-24

## 2025-06-24 RX ORDER — NITROGLYCERIN 0.4 MG/1
0.4 TABLET SUBLINGUAL EVERY 5 MIN PRN
Status: DISCONTINUED | OUTPATIENT
Start: 2025-06-24 | End: 2025-06-24 | Stop reason: HOSPADM

## 2025-06-24 RX ADMIN — HEPARIN SODIUM 600 UNITS/HR: 10000 INJECTION, SOLUTION INTRAVENOUS at 10:48

## 2025-06-24 RX ADMIN — TECHNETIUM TC 99M SESTAMIBI 10.4 MILLICURIE: 1 INJECTION INTRAVENOUS at 07:40

## 2025-06-24 RX ADMIN — DOXAZOSIN 2 MG: 1 TABLET ORAL at 08:25

## 2025-06-24 RX ADMIN — TECHNETIUM TC 99M SESTAMIBI 29 MILLICURIE: 1 INJECTION INTRAVENOUS at 10:25

## 2025-06-24 RX ADMIN — METOPROLOL SUCCINATE 25 MG: 25 TABLET, EXTENDED RELEASE ORAL at 08:30

## 2025-06-24 RX ADMIN — LOSARTAN POTASSIUM 50 MG: 50 TABLET, FILM COATED ORAL at 08:20

## 2025-06-24 RX ADMIN — PANTOPRAZOLE SODIUM 40 MG: 40 TABLET, DELAYED RELEASE ORAL at 08:17

## 2025-06-24 RX ADMIN — ISOSORBIDE MONONITRATE 30 MG: 30 TABLET, EXTENDED RELEASE ORAL at 08:16

## 2025-06-24 RX ADMIN — REGADENOSON 0.4 MG: 0.08 INJECTION, SOLUTION INTRAVENOUS at 10:10

## 2025-06-24 ASSESSMENT — ACTIVITIES OF DAILY LIVING (ADL)
ADLS_ACUITY_SCORE: 36
ADLS_ACUITY_SCORE: 32
ADLS_ACUITY_SCORE: 36
ADLS_ACUITY_SCORE: 32
ADLS_ACUITY_SCORE: 36
ADLS_ACUITY_SCORE: 32
ADLS_ACUITY_SCORE: 36

## 2025-06-24 NOTE — PLAN OF CARE
"Goal Outcome Evaluation:      Plan of Care Reviewed With: patient    Overall Patient Progress: no changeOverall Patient Progress: no change    Outcome Evaluation: On IV Heparin gtt, Tele: SR. NPO at midnight for lexiscan - pt anxious about timing.    **Addendum: Tele is Afib SVR not SR    Problem: Adult Inpatient Plan of Care  Goal: Plan of Care Review  Description: The Plan of Care Review/Shift note should be completed every shift.  The Outcome Evaluation is a brief statement about your assessment that the patient is improving, declining, or no change.  This information will be displayed automatically on your shift  note.  Outcome: Progressing  Flowsheets (Taken 6/24/2025 0132)  Outcome Evaluation: On IV Heparin gtt, Tele: SR. NPO at midnight for lexiscan - pt anxious about timing.  Plan of Care Reviewed With: patient  Overall Patient Progress: no change  Goal: Patient-Specific Goal (Individualized)  Description: You can add care plan individualizations to a care plan. Examples of Individualization might be:  \"Parent requests to be called daily at 9am for status\", \"I have a hard time hearing out of my right ear\", or \"Do not touch me to wake me up as it startles  me\".  Outcome: Progressing  Goal: Absence of Hospital-Acquired Illness or Injury  Outcome: Progressing  Intervention: Identify and Manage Fall Risk  Recent Flowsheet Documentation  Taken 6/24/2025 0000 by Jessenia Johns, RN  Safety Promotion/Fall Prevention:   safety round/check completed   supervised activity   room organization consistent   room door open   room near nurse's station   patient and family education   nonskid shoes/slippers when out of bed   mobility aid in reach   lighting adjusted   increase visualization of patient   increased rounding and observation   clutter free environment maintained   activity supervised   assistive device/personal items within reach  Taken 6/23/2025 1957 by Jessenia Johns, RN  Safety Promotion/Fall " Prevention:   safety round/check completed   supervised activity   room organization consistent   room door open   room near nurse's station   patient and family education   nonskid shoes/slippers when out of bed   mobility aid in reach   lighting adjusted   increase visualization of patient   increased rounding and observation   clutter free environment maintained   activity supervised   assistive device/personal items within reach  Taken 6/23/2025 1923 by Jessenia Johns RN  Safety Promotion/Fall Prevention: safety round/check completed  Intervention: Prevent Skin Injury  Recent Flowsheet Documentation  Taken 6/24/2025 0000 by Jessenia Johns RN  Body Position: position changed independently  Taken 6/23/2025 1957 by Jessenia Johns RN  Body Position: position changed independently  Taken 6/23/2025 1923 by Jessenia Johns RN  Body Position: position changed independently  Intervention: Prevent and Manage VTE (Venous Thromboembolism) Risk  Recent Flowsheet Documentation  Taken 6/24/2025 0000 by Jessenia Johns RN  VTE Prevention/Management: (IV Heparin)   patient refused intervention   other (see comments)  Taken 6/23/2025 1957 by Jessenia Johns RN  VTE Prevention/Management: (IV Heparin)   patient refused intervention   other (see comments)  Intervention: Prevent Infection  Recent Flowsheet Documentation  Taken 6/24/2025 0000 by Jessenia Johns RN  Infection Prevention: rest/sleep promoted  Taken 6/23/2025 1957 by Jessenia Johns RN  Infection Prevention: rest/sleep promoted  Goal: Optimal Comfort and Wellbeing  Outcome: Progressing  Intervention: Provide Person-Centered Care  Recent Flowsheet Documentation  Taken 6/24/2025 0000 by Jessenia Johns RN  Trust Relationship/Rapport:   care explained   choices provided   emotional support provided   empathic listening provided   questions answered   questions encouraged   reassurance provided   thoughts/feelings acknowledged  Taken 6/23/2025 1957 by  Jessenia Johns RN  Trust Relationship/Rapport:   care explained   choices provided   emotional support provided   empathic listening provided   questions answered   questions encouraged   reassurance provided   thoughts/feelings acknowledged  Goal: Readiness for Transition of Care  Outcome: Progressing     Problem: Delirium  Goal: Optimal Coping  Outcome: Progressing  Goal: Improved Behavioral Control  Outcome: Progressing  Intervention: Minimize Safety Risk  Recent Flowsheet Documentation  Taken 6/24/2025 0000 by Jessenia Johns RN  Enhanced Safety Measures: room near unit station  Trust Relationship/Rapport:   care explained   choices provided   emotional support provided   empathic listening provided   questions answered   questions encouraged   reassurance provided   thoughts/feelings acknowledged  Taken 6/23/2025 1957 by Jessenia Johns RN  Enhanced Safety Measures: room near unit station  Trust Relationship/Rapport:   care explained   choices provided   emotional support provided   empathic listening provided   questions answered   questions encouraged   reassurance provided   thoughts/feelings acknowledged  Goal: Improved Attention and Thought Clarity  Outcome: Progressing  Goal: Improved Sleep  Outcome: Progressing     Problem: Dysrhythmia  Goal: Normalized Cardiac Rhythm  Outcome: Progressing  Intervention: Monitor and Manage Cardiac Rhythm Effect  Recent Flowsheet Documentation  Taken 6/24/2025 0000 by Jessenia Johns RN  VTE Prevention/Management: (IV Heparin)   patient refused intervention   other (see comments)  Taken 6/23/2025 1957 by Jessenia Johns RN  VTE Prevention/Management: (IV Heparin)   patient refused intervention   other (see comments)     Problem: Chest Pain  Goal: Resolution of Chest Pain Symptoms  Outcome: Progressing

## 2025-06-24 NOTE — PROGRESS NOTES
SPIRITUAL HEALTH SERVICES Progress Note  MS 3    I was notified by staff that patient was offered Sabianism communion by a [facility] .    Patient said his Synagogue preference is not Sabianism.  He is Mosque.    I changed this designation in patient's medical record.    [facility] remains available for additional assistance upon request.    . Nia Pimentel M.Div.  Staff   Phone  [phone]

## 2025-06-24 NOTE — PROGRESS NOTES
Cardiology Progress Note  Nancy BRISCOE, CNP     Follows with I       Assessment and Plan:     Admit (6/22/25) with sudden onset of right-sided chest pain relieved with SL NTG    PMH:  Coronary artery disease s/p CABG, atrial fibrillation, DVT, hypertension, hyperlipidemia, CKD    Chest Pain, atypical  Coronary Artery Disease  -troponin 67, flat trajectory.  Hx of mildly elevated troponin levels in setting of CKD  -hx of 3V CABG (2003) with LIMA to LAD and separate SVG to RCA and OM  s/p Redo CABG (2008) with SVG to LAD due to LIMA occlusion  -Plan for Winifred NUC stress today  -LVEF 50-55% with proximal anteroseptal and inferoseptal hypokinesis  -No CP overnight  -beta-blocker, statin, Heparin, isosorbide    NEW Mild Cardiomyopathy, likely ischemic  -LVEF 50-55% with proximal anteroseptal and inferoseptal hypokinesis    -will check daily weights  -GMT:  Metoprolol XR, ARB, Isosorbide    Hypertension:  -BP borderline elevated this am    Permanent Atrial Fibrillation  -HRs well controlled on low dose Metoprolol XR  -chronic Eliquis on hold, IV Heparin  (CHADS2 Vasc score: 5)    Stage III CKD  -creatinine stable at 1.4               Interval History:     Family at bedside  Denies CP, SOB, palpitations                Medications:     Current Facility-Administered Medications   Medication Dose Route Frequency Provider Last Rate Last Admin    doxazosin (CARDURA) tablet 2 mg  2 mg Oral Daily Apolinar Zavaleta MD   2 mg at 06/23/25 0955    isosorbide mononitrate (IMDUR) 24 hr tablet 30 mg  30 mg Oral Daily Apolinar Zavaleta MD   30 mg at 06/23/25 0812    losartan (COZAAR) tablet 25 mg  25 mg Oral Every Other Day Zeke Ramey MD   25 mg at 06/23/25 1206    losartan (COZAAR) tablet 50 mg  50 mg Oral Every Other Day Zeke Ramey MD        metoprolol succinate ER (TOPROL XL) 24 hr tablet 25 mg  25 mg Oral Daily Apolinar Zavaleta MD   25 mg at 06/23/25 0812    pantoprazole (PROTONIX) EC tablet 40 mg  40 mg  "Oral QAM Apolinar Zavaleta MD   40 mg at 06/23/25 0812    rosuvastatin (CRESTOR) tablet 20 mg  20 mg Oral QPM Apolinar Zavaleta MD   20 mg at 06/23/25 1956    sodium chloride (PF) 0.9% PF flush 3 mL  3 mL Intracatheter Q8H Apolinar Martinez MD        sodium chloride (PF) 0.9% PF flush 3 mL  3 mL Intracatheter Q8H Apolinar Martinez MD        technetium sestamibi 2 UD per study (Tc99m MiBi) radioisotope injection 3-42 millicurie  3-42 millicurie Intravenous Q2H Ip, Enrique Good MD                Physical Exam:   Blood pressure (!) 149/80, pulse 59, temperature 97.9  F (36.6  C), temperature source Oral, resp. rate 16, height 1.753 m (5' 9\"), weight 74.3 kg (163 lb 12.8 oz), SpO2 97%.  Wt Readings from Last 3 Encounters:   06/22/25 74.3 kg (163 lb 12.8 oz)   09/20/21 74.8 kg (165 lb)   07/20/18 80.8 kg (178 lb 3.2 oz)     I/O last 3 completed shifts:  In: 790 [P.O.:660; I.V.:130]  Out: -     CONST:  alert and oriented  LUNGS:  CTA  CARDIO:  RRR  ABD:  soft  EXT:  no edema           Data:   TELE:  A-fib    CBC  Recent Labs   Lab 06/22/25  1910 06/22/25  1633   WBC 5.9 5.8   HGB 12.4* 12.2*    151       BMP  Recent Labs   Lab 06/24/25  0601 06/23/25  0600 06/22/25  2055 06/22/25  1633    141  --  140   POTASSIUM 4.1 4.1  --  4.3   CHLORIDE 106 106  --  102   ADAMARIS 9.3 9.7  --  10.0   CO2 23 25  --  26   BUN 28.9* 25.3*  --  21.0   CR 1.47* 1.43*  --  1.46*   GLC 84 84 84 96     No results for input(s): \"CHOL\", \"HDL\", \"LDL\", \"TRIG\", \"CHOLHDLRATIO\" in the last 40260 hours.    TROP  Lab Results   Component Value Date    TROPI  02/15/2017     <0.015  The 99th percentile for upper reference range is 0.045 ug/L.  Troponin values in   the range of 0.045 - 0.120 ug/L may be associated with risks of adverse   clinical events.      TROPI  02/15/2017     <0.015  The 99th percentile for upper reference range is 0.045 ug/L.  Troponin values in   the range of 0.045 - 0.120 ug/L may be associated with risks of " adverse   clinical events.         BNP  Recent Labs   Lab 06/22/25  1633   NTBNP 3,235*

## 2025-06-24 NOTE — PLAN OF CARE
"Had pt [id number], called in overnight. Observation patient. A&O x4, forgetful. Tele, afib CVR w/ prolong QT. Denies CP/SOB/Pain overnight. Heparin running @ 600 units/hr, PTT recheck 6/25 @0600. NPO at 0000 for lexiscan in AM.     B/P: 147/67, T: 98.1, P: 54, R: 16     Problem: Adult Inpatient Plan of Care  Goal: Plan of Care Review  Description: The Plan of Care Review/Shift note should be completed every shift.  The Outcome Evaluation is a brief statement about your assessment that the patient is improving, declining, or no change.  This information will be displayed automatically on your shift  note.  Outcome: Progressing  Flowsheets (Taken 6/24/2025 0725)  Plan of Care Reviewed With: patient  Overall Patient Progress: no change  Goal: Patient-Specific Goal (Individualized)  Description: You can add care plan individualizations to a care plan. Examples of Individualization might be:  \"Parent requests to be called daily at 9am for status\", \"I have a hard time hearing out of my right ear\", or \"Do not touch me to wake me up as it startles  me\".  Outcome: Progressing  Goal: Absence of Hospital-Acquired Illness or Injury  Outcome: Progressing  Intervention: Prevent Skin Injury  Recent Flowsheet Documentation  Taken 6/24/2025 0233 by Martha Quarles RN  Body Position:   position changed independently   weight shifting  Intervention: Prevent and Manage VTE (Venous Thromboembolism) Risk  Recent Flowsheet Documentation  Taken 6/24/2025 0215 by Martha Quarles RN  VTE Prevention/Management: (heparin gtt) patient refused intervention  Goal: Optimal Comfort and Wellbeing  Outcome: Progressing  Intervention: Monitor Pain and Promote Comfort  Recent Flowsheet Documentation  Taken 6/24/2025 0215 by Martha Quarles RN  Pain Management Interventions:   care clustered   declines   rest  Goal: Readiness for Transition of Care  Outcome: Progressing     Problem: Delirium  Goal: Optimal Coping  Outcome: Progressing  Goal: " Improved Behavioral Control  Outcome: Progressing  Goal: Improved Attention and Thought Clarity  Outcome: Progressing  Goal: Improved Sleep  Outcome: Progressing     Problem: Dysrhythmia  Goal: Normalized Cardiac Rhythm  Outcome: Progressing  Intervention: Monitor and Manage Cardiac Rhythm Effect  Recent Flowsheet Documentation  Taken 6/24/2025 0215 by Martha Quarles, RN  VTE Prevention/Management: (heparin gtt) patient refused intervention     Problem: Chest Pain  Goal: Resolution of Chest Pain Symptoms  Outcome: Progressing   Goal Outcome Evaluation:      Plan of Care Reviewed With: patient    Overall Patient Progress: no changeOverall Patient Progress: no change

## 2025-06-24 NOTE — DISCHARGE SUMMARY
[facility]  Hospitalist Discharge Summary      Date of Admission:  6/22/2025  Date of Discharge:  6/24/2025  Discharging Provider: Howie Calderon MD  Discharge Service: Hospitalist Service    Discharge Diagnoses   Atypical chest pain  Work up not suggestive of ACS  Suspected chronic troponin elevation  Chronic coronary artery disease  History of permanent atrial fibrillation on chronic anticoagulation  History of CKD  History of prior VTE on anticoagulation  Drug-induced coagulopathy  History of prior TIA    Clinically Significant Risk Factors          Follow-ups Needed After Discharge   Follow-up Appointments       Hospital Follow-up with Existing Primary Care Provider (PCP)          Schedule Primary Care visit within: 14 Days       Follow Up      Follow up with cardiology as scheduled                Unresulted Labs Ordered in the Past 30 Days of this Admission       No orders found from 5/23/2025 to 6/23/2025.            Discharge Disposition   Discharged to home  Condition at discharge: Stable    Hospital Course   Robby Mccoy is a 84 year old male admitted on 6/22/2025 with PMH of coronary artery disease status CABG in 2003 with LIMA to LAD, SVG to RCA, SVG to OM.  Redo bypass surgery in 2008 with vein graft to the LAD as LIMA graft was occluded, DVT, hypertension, dyslipidemia, chronic kidney disease, and TIA who presented after 15 to 20 minutes of right-sided chest pain that began while at rest and resolved with NTG.  Patient was found to have troponin elevation and was subsequently hospitalized and monitored closely on the cardiac telemetry floor.  Patient was seen by cardiology and non cardiiac chest pain was suspected. Lexiscan strewss test was done and showed no significant inducible ischemia. Chest pain resolved. Patient was discharged home with recommendation that he follow up with his primary cardiologist.          Consultations This Hospital Stay   PHARMACY IP  CONSULT  PHARMACY IP CONSULT  CARDIOLOGY IP CONSULT    Code Status   Full Code    Time Spent on this Encounter   I, Howie Calderon MD, personally saw the patient today and spent greater than 30 minutes discharging this patient.       Howie Calderon MD  [place] [address] [facility]  [address] [facility] [address]  Phone: [phone]  Fax: [phone]  ______________________________________________________________________    Physical Exam   Vital Signs: Temp: (P) 97.5  F (36.4  C) Temp src: (P) Oral BP: 137/67 Pulse: 61   Resp: 16 SpO2: 98 % O2 Device: None (Room air)    Weight: 164 lbs 3.2 oz  GENERAL:  Comfortable. Cooperative.  PSYCH: pleasant, oriented, No acute distress.  EYES: PERRLA, Normal conjunctiva.  HEART:  Regular rate and rhythm. No JVD. Pulses normal. No edema.  LUNGS:  Clear to auscultation, normal Respiratory effort.  ABDOMEN:  Soft, no hepatosplenomegaly, normal bowel sounds.  EXTREMETIES: No clubbing, cyanosis or ischemia  SKIN:  Dry to touch, No rash.         Primary Care Physician   [facility]    Discharge Orders      Reason for your hospital stay    Robby Mccoy is a 84 year old male admitted on 6/22/2025 with PMH of coronary artery disease status CABG in 2003 with LIMA to LAD, SVG to RCA, SVG to OM.  Redo bypass surgery in 2008 with vein graft to the LAD as LIMA graft was occluded, DVT, hypertension, dyslipidemia, chronic kidney disease, TIA who presents after 15 to 20 minutes of right-sided chest pain that began this morning while at rest and resolved with NTG.  Found to have troponin elevation and was subsequently hospitalized and was monitored closely in the cardiac telemetry floor.  Currently being seen by cardiology service with intention in pursuing further risk stratification with pending Lexiscan testing today.  Fortunately he is exhibiting no worsening cardiorespiratory symptoms.  Continues to demonstrate stable  hemodynamics.  No reported alteration in mental state.  Currently he is not hypoxic.  He is exhibiting no bleeding symptoms or tendencies with underlying use of heparinization.  Hopeful for hospital discharge if no further plans or issues seen during this hospitalization.  Currently he is on good set of cardiac meds with continuation of his ARB, beta-blockers, long-term anticoagulation, DOAC, nitrates and and statins.     Activity    Your activity upon discharge: activity as tolerated     Follow Up    Follow up with cardiology as scheduled     Full Code     Diet    Follow this diet upon discharge: Current Diet:Orders Placed This Encounter      Combination Diet Regular Diet Adult; Low Saturated Fat Na <2400mg Diet     Hospital Follow-up with Existing Primary Care Provider (PCP)            Significant Results and Procedures   Most Recent 3 CBC's:  Recent Labs   Lab Test 06/22/25  1910 06/22/25  1633   WBC 5.9 5.8   HGB 12.4* 12.2*   MCV 87 87    151     Most Recent 3 BMP's:  Recent Labs   Lab Test 06/24/25  0601 06/23/25  0600 06/22/25 2055 06/22/25  1633    141  --  140   POTASSIUM 4.1 4.1  --  4.3   CHLORIDE 106 106  --  102   CO2 23 25  --  26   BUN 28.9* 25.3*  --  21.0   CR 1.47* 1.43*  --  1.46*   ANIONGAP 11 10  --  12   ADAMARIS 9.3 9.7  --  10.0   GLC 84 84 84 96     Most Recent 2 LFT's:No lab results found.  Most Recent 3 Troponin's:No lab results found.,   Results for orders placed or performed during the hospital encounter of 06/22/25   XR Chest 2 Views    Narrative    EXAM: XR CHEST 2 VIEWS  LOCATION: [facility]  DATE: 6/22/2025    INDICATION: Right-sided chest pain.  COMPARISON: 3/3/2025      Impression    IMPRESSION: Again seen are sternotomy wires with mediastinal clips and vascular markers. The cardiac silhouette is stable in size, near the upper limit of normal. Pulmonary vascularity is within normal limits. The lungs are clear. No evidence of pleural   effusion.  Postoperative changes lower cervical spine. Mild degenerative changes thoracic spine. No acute changes identified.   US Lower Extremity Venous Duplex Right    Narrative    EXAM: US LOWER EXTREMITY VENOUS DUPLEX RIGHT  LOCATION: [facility]  DATE: 2025    INDICATION: right sided leg swelling  COMPARISON: None.  TECHNIQUE: Venous Duplex ultrasound of the right lower extremity with and without compression, augmentation and duplex. Color flow and spectral Doppler with waveform analysis performed.    FINDINGS: Exam includes the common femoral, femoral, popliteal, and contralateral common femoral veins as well as segmentally visualized deep calf veins and greater saphenous vein.     RIGHT: No deep vein thrombosis. No superficial thrombophlebitis. No popliteal cyst.      Impression    IMPRESSION:  1.  No deep venous thrombosis in the right lower extremity.   Echocardiogram Complete     Value    LVEF  50-55%    Narrative    [id number]  [id number]  [id number]  [id number]^ALEXX^YARITZA^LETICIA     [facility]  Echocardiography Laboratory  [address] [place] [address]     Name: IMMANUEL GUTIERREZ  MRN: [id number]  : 1940  Study Date: 2025 10:27 AM  Age: 84 yrs  Gender: Male  Patient Location: [facility]  Reason For Study: Acute Myocardial Infarction  Ordering Physician: YARITZA COFFEY  Referring Physician: [facility]  Performed By: Lucy Tam RDCS     BSA: 1.9 m2  Height: 69 in  Weight: 163 lb  HR: 61  BP: 173/63 mmHg  ______________________________________________________________________________  Procedure  Echocardiogram with two-dimensional, color and spectral Doppler.  ______________________________________________________________________________  Interpretation Summary     The visual ejection fraction is 50-55%.  Mild proximal anterior septal and mild-moderate inferior septal hypokinesis.  No significant valvular heart  disease.  ______________________________________________________________________________  Left Ventricle  The left ventricle is normal in size. There is moderate to severe concentric  left ventricular hypertrophy. Diastolic Doppler findings (E/E' ratio and/or  other parameters) suggest left ventricular filling pressures are increased.  The visual ejection fraction is 50-55%. Mild proximal anterior septal and  mild-moderate inferior septal hypokinesis.     Right Ventricle  The right ventricle is normal in size and function.     Atria  The left atrium is mild to moderately dilated. The right atrium is mild to  moderately dilated. There is no color Doppler evidence of an atrial shunt.     Mitral Valve  The mitral valve leaflets are mildly thickened. There is trace mitral  regurgitation.     Tricuspid Valve  There is mild (1+) tricuspid regurgitation. IVC diameter and respiratory  changes fall into an intermediate range suggesting an RA pressure of 8 mmHg.  The right ventricular systolic pressure is approximated at 44.2 mmHg plus the  right atrial pressure.     Aortic Valve  There is mild trileaflet aortic sclerosis. There is trace to mild aortic  regurgitation. No hemodynamically significant valvular aortic stenosis.     Pulmonic Valve  There is mild (1+) pulmonic valvular regurgitation.     Vessels  The aortic root is normal size. Ascending aorta dilatation is present.     Pericardium  There is no pericardial effusion.     ______________________________________________________________________________  MMode/2D Measurements & Calculations  IVSd: 1.8 cm  LVIDd: 4.0 cm  LVIDs: 3.4 cm  LVPWd: 2.0 cm  IVC diam: 1.5 cm  FS: 14.2 %  LV mass(C)d: 344.2 grams  LV mass(C)dI: 181.8 grams/m2     Ao root diam: 3.5 cm  LA dimension: 4.5 cm  LA/Ao: 1.3  LVOT diam: 2.4 cm  LVOT area: 4.4 cm2  Ao root diam index Ht(cm/m): 2.0  Ao root diam index BSA (cm/m2): 1.9  LA Volume (BP): 80.5 ml  LA Volume Index (BP): 42.6 ml/m2  RV Base: 3.7  cm  RWT: 1.0  TAPSE: 1.2 cm     Doppler Measurements & Calculations  MV E max ray: 97.4 cm/sec  MV A max ray: 31.2 cm/sec  MV E/A: 3.1  MV dec slope: 614.8 cm/sec2  MV dec time: 0.16 sec  Ao V2 max: 128.8 cm/sec  Ao max P.8 mmHg  AI P1/2t: 594.1 msec  PA acc time: 0.11 sec  TR max ray: 332.2 cm/sec  TR max P.2 mmHg  E/E' av.9  Lateral E/e': 14.6  Medial E/e': 23.2  RV S Ray: 9.2 cm/sec     ______________________________________________________________________________  Report approved by: Joel Quevedo MD on 2025 12:33 PM         NM MPI w Lexiscan     Value    Target     BP 50    Left Ventricular EF 51    Stress/rest perfusion ratio 1.14    Narrative      The nuclear stress test is abnormal.    There is a small area of nontransmural infarction in the basal to mid   inferoseptal segment(s) of the left ventricle. No significant nova-infarct   ischemia.    Left ventricular function is low normal.    The left ventricular ejection fraction at rest is 50%.  The left   ventricular ejection fraction at stress is 51%.    LV chamber size normal.    Stress to rest cavity ratio is 1.14.  TID is absent.    There is no prior study for comparison.         Discharge Medications      Review of your medicines        CONTINUE these medicines which have NOT CHANGED        Dose / Directions   apixaban ANTICOAGULANT 5 MG tablet  Commonly known as: ELIQUIS      Dose: 5 mg  Take 5 mg by mouth 2 times daily.  Refills: 0     Crestor 20 MG tablet  Generic drug: rosuvastatin      Dose: 20 mg  Take 20 mg by mouth every evening  Refills: 0     doxazosin 2 MG tablet  Commonly known as: CARDURA      Dose: 4 mg  Take 4 mg by mouth at bedtime.  Refills: 0     furosemide 20 MG tablet  Commonly known as: LASIX      Dose: 20 mg  Take 20 mg by mouth daily.  Refills: 0     isosorbide mononitrate 30 MG 24 hr tablet  Commonly known as: IMDUR      Dose: 30 mg  Take 30 mg by mouth daily.  Refills: 0     * losartan 25 MG  tablet  Commonly known as: COZAAR      Dose: 25 mg  Take 25 mg by mouth every other day. Alternate between 25 mg one day, then 50 mg the next day.  Refills: 0     * losartan 50 MG tablet  Commonly known as: COZAAR      Dose: 50 mg  Take 50 mg by mouth every other day. Alternate between 25 mg one day, then 50 mg the next day.  Refills: 0     metoprolol succinate ER 25 MG 24 hr tablet  Commonly known as: TOPROL XL      Dose: 25 mg  Take 25 mg by mouth every evening.  Refills: 0     nitroGLYcerin 0.6 MG sublingual tablet  Commonly known as: NITROSTAT      Dose: 0.6 mg  Place 0.6 mg under the tongue every 5 minutes as needed for chest pain. For chest pain place 1 tablet under the tongue every 5 minutes for 3 doses. If symptoms persist 5 minutes after 1st dose call 911.  Refills: 0     vitamin D2 71885 units (1250 mcg) capsule  Commonly known as: ERGOCALCIFEROL      Dose: 50,000 Units  Take 50,000 Units by mouth once a week.  Refills: 0           * This list has 2 medication(s) that are the same as other medications prescribed for you. Read the directions carefully, and ask your doctor or other care provider to review them with you.                Allergies   Allergies   Allergen Reactions    Clindamycin Base Rash

## 2025-06-24 NOTE — PROGRESS NOTES
PRIMARY DIAGNOSIS: CHEST PAIN  OUTPATIENT/OBSERVATION GOALS TO BE MET BEFORE DISCHARGE:  1. Ruled out acute coronary syndrome (negative or stable Troponin):  Yes  2. Pain Status: Pain free.  3. Appropriate provocative testing performed: Stress test to be performed today  - Stress Test Procedure: Nuclear  - Interpretation of cardiac rhythm per telemetry tech: Afib SVR     4. Cleared by Consultants (if applicable): Unsure, stress test today, discharge TBD   5. Return to near baseline physical activity: Yes  Discharge Planner Nurse   Safe discharge environment identified: Yes  Barriers to discharge: No       Entered by: Martha Quarles RN 06/24/2025 5:16 AM     Please review provider order for any additional goals.   Nurse to notify provider when observation goals have been met and patient is ready for discharge.

## 2025-08-16 ENCOUNTER — HEALTH MAINTENANCE LETTER (OUTPATIENT)
Age: 85
End: 2025-08-16

## (undated) DEVICE — SOL NACL 0.9% IRRIG 1000ML BOTTLE 2F7124

## (undated) DEVICE — PACK TUR CUSTOM SBA15RUFSE

## (undated) DEVICE — CATH HOLDER STRAP 36600

## (undated) DEVICE — SOL WATER IRRIG 1000ML BOTTLE 2F7114

## (undated) DEVICE — PAD CHUX UNDERPAD 30X36" P3036C

## (undated) DEVICE — DRAPE GYN/UROLOGY FLUID POUCH TUR 29455

## (undated) DEVICE — BAG URINARY DRAIN 2000ML LF 154002

## (undated) DEVICE — TUBING SUCTION MEDI-VAC SOFT 3/16"X20' N520A

## (undated) DEVICE — SUCTION CANISTER MEDIVAC LINER 3000ML W/LID 65651-530

## (undated) DEVICE — PAD CHUX UNDERPAD 23X24" 7136

## (undated) DEVICE — BAG RED BIOHAZARD 37X50" 40GAL A7450PR

## (undated) DEVICE — ENDO FORCEP ENDOJAW BIOPSY 2.8MMX160CM FB-220K

## (undated) DEVICE — GLOVE PROTEXIS W/NEU-THERA 7.5  2D73TE75

## (undated) DEVICE — SOL NACL 0.9% INJ 1000ML BAG 07983-09

## (undated) DEVICE — TUBING SUCTION 6"X3/16" N56A

## (undated) DEVICE — ESU GROUND PAD UNIVERSAL W/O CORD

## (undated) DEVICE — KIT PROCEDURE W/CLEAN-A-SCOPE LINERS V2 200800

## (undated) DEVICE — TUBING IV SOLUTION SET SECONDARY 34" 2C7451

## (undated) DEVICE — CATH FOLEY COUDE 16FR 5ML LATEX

## (undated) DEVICE — BAG CLEAR TRASH 1.3M 39X33" P4040C

## (undated) DEVICE — SOL NACL 0.9% IRRIG 3000ML BAG 2B7477

## (undated) RX ORDER — LIDOCAINE HYDROCHLORIDE 10 MG/ML
INJECTION, SOLUTION EPIDURAL; INFILTRATION; INTRACAUDAL; PERINEURAL
Status: DISPENSED
Start: 2018-04-18

## (undated) RX ORDER — FENTANYL CITRATE 50 UG/ML
INJECTION, SOLUTION INTRAMUSCULAR; INTRAVENOUS
Status: DISPENSED
Start: 2018-07-20

## (undated) RX ORDER — PROPOFOL 10 MG/ML
INJECTION, EMULSION INTRAVENOUS
Status: DISPENSED
Start: 2018-07-20

## (undated) RX ORDER — BUPIVACAINE HYDROCHLORIDE 5 MG/ML
INJECTION, SOLUTION EPIDURAL; INTRACAUDAL
Status: DISPENSED
Start: 2018-04-18

## (undated) RX ORDER — GLYCOPYRROLATE 0.2 MG/ML
INJECTION INTRAMUSCULAR; INTRAVENOUS
Status: DISPENSED
Start: 2021-09-20

## (undated) RX ORDER — METHYLPREDNISOLONE ACETATE 80 MG/ML
INJECTION, SUSPENSION INTRA-ARTICULAR; INTRALESIONAL; INTRAMUSCULAR; SOFT TISSUE
Status: DISPENSED
Start: 2018-04-18

## (undated) RX ORDER — PROPOFOL 10 MG/ML
INJECTION, EMULSION INTRAVENOUS
Status: DISPENSED
Start: 2021-09-20

## (undated) RX ORDER — FUROSEMIDE 10 MG/ML
INJECTION INTRAMUSCULAR; INTRAVENOUS
Status: DISPENSED
Start: 2018-07-20